# Patient Record
Sex: FEMALE | Race: WHITE | NOT HISPANIC OR LATINO | Employment: UNEMPLOYED | URBAN - METROPOLITAN AREA
[De-identification: names, ages, dates, MRNs, and addresses within clinical notes are randomized per-mention and may not be internally consistent; named-entity substitution may affect disease eponyms.]

---

## 2017-09-14 ENCOUNTER — APPOINTMENT (OUTPATIENT)
Dept: RADIOLOGY | Facility: MEDICAL CENTER | Age: 32
DRG: 689 | End: 2017-09-14
Attending: STUDENT IN AN ORGANIZED HEALTH CARE EDUCATION/TRAINING PROGRAM
Payer: MEDICAID

## 2017-09-14 ENCOUNTER — HOSPITAL ENCOUNTER (INPATIENT)
Facility: MEDICAL CENTER | Age: 32
LOS: 5 days | DRG: 689 | End: 2017-09-19
Attending: EMERGENCY MEDICINE | Admitting: INTERNAL MEDICINE
Payer: MEDICAID

## 2017-09-14 ENCOUNTER — RESOLUTE PROFESSIONAL BILLING HOSPITAL PROF FEE (OUTPATIENT)
Dept: HOSPITALIST | Facility: MEDICAL CENTER | Age: 32
End: 2017-09-14
Payer: MEDICAID

## 2017-09-14 ENCOUNTER — APPOINTMENT (OUTPATIENT)
Dept: RADIOLOGY | Facility: MEDICAL CENTER | Age: 32
DRG: 689 | End: 2017-09-14
Attending: EMERGENCY MEDICINE
Payer: MEDICAID

## 2017-09-14 DIAGNOSIS — R10.84 GENERALIZED ABDOMINAL PAIN: ICD-10-CM

## 2017-09-14 DIAGNOSIS — R11.2 INTRACTABLE VOMITING WITH NAUSEA, UNSPECIFIED VOMITING TYPE: ICD-10-CM

## 2017-09-14 DIAGNOSIS — E86.0 DEHYDRATION: ICD-10-CM

## 2017-09-14 DIAGNOSIS — F12.90 MARIJUANA SMOKER: ICD-10-CM

## 2017-09-14 PROBLEM — E87.6 HYPOKALEMIA: Status: ACTIVE | Noted: 2017-09-14

## 2017-09-14 LAB
ALBUMIN SERPL BCP-MCNC: 4.2 G/DL (ref 3.2–4.9)
ALBUMIN/GLOB SERPL: 1.2 G/DL
ALP SERPL-CCNC: 63 U/L (ref 30–99)
ALT SERPL-CCNC: 10 U/L (ref 2–50)
AMPHET UR QL SCN: NEGATIVE
ANION GAP SERPL CALC-SCNC: 13 MMOL/L (ref 0–11.9)
APPEARANCE UR: CLEAR
AST SERPL-CCNC: 11 U/L (ref 12–45)
BACTERIA #/AREA URNS HPF: NEGATIVE /HPF
BARBITURATES UR QL SCN: NEGATIVE
BASOPHILS # BLD AUTO: 0.4 % (ref 0–1.8)
BASOPHILS # BLD: 0.04 K/UL (ref 0–0.12)
BENZODIAZ UR QL SCN: NEGATIVE
BILIRUB SERPL-MCNC: 0.5 MG/DL (ref 0.1–1.5)
BILIRUB UR QL STRIP.AUTO: NEGATIVE
BUN SERPL-MCNC: 6 MG/DL (ref 8–22)
BZE UR QL SCN: NEGATIVE
CALCIUM SERPL-MCNC: 9.1 MG/DL (ref 8.5–10.5)
CANNABINOIDS UR QL SCN: POSITIVE
CHLORIDE SERPL-SCNC: 104 MMOL/L (ref 96–112)
CO2 SERPL-SCNC: 20 MMOL/L (ref 20–33)
COLOR UR: ABNORMAL
CREAT SERPL-MCNC: 0.63 MG/DL (ref 0.5–1.4)
CULTURE IF INDICATED INDCX: NO UA CULTURE
EOSINOPHIL # BLD AUTO: 0.05 K/UL (ref 0–0.51)
EOSINOPHIL NFR BLD: 0.5 % (ref 0–6.9)
EPI CELLS #/AREA URNS HPF: ABNORMAL /HPF
ERYTHROCYTE [DISTWIDTH] IN BLOOD BY AUTOMATED COUNT: 37.7 FL (ref 35.9–50)
GFR SERPL CREATININE-BSD FRML MDRD: >60 ML/MIN/1.73 M 2
GLOBULIN SER CALC-MCNC: 3.4 G/DL (ref 1.9–3.5)
GLUCOSE SERPL-MCNC: 133 MG/DL (ref 65–99)
GLUCOSE UR STRIP.AUTO-MCNC: ABNORMAL MG/DL
HCG SERPL QL: NEGATIVE
HCT VFR BLD AUTO: 41.1 % (ref 37–47)
HGB BLD-MCNC: 14.1 G/DL (ref 12–16)
HYALINE CASTS #/AREA URNS LPF: ABNORMAL /LPF
IMM GRANULOCYTES # BLD AUTO: 0.06 K/UL (ref 0–0.11)
IMM GRANULOCYTES NFR BLD AUTO: 0.6 % (ref 0–0.9)
KETONES UR STRIP.AUTO-MCNC: 100 MG/DL
LACTATE BLD-SCNC: 1.6 MMOL/L (ref 0.5–2)
LEUKOCYTE ESTERASE UR QL STRIP.AUTO: NEGATIVE
LIPASE SERPL-CCNC: 7 U/L (ref 11–82)
LYMPHOCYTES # BLD AUTO: 1.42 K/UL (ref 1–4.8)
LYMPHOCYTES NFR BLD: 13.4 % (ref 22–41)
MAGNESIUM SERPL-MCNC: 1.8 MG/DL (ref 1.5–2.5)
MCH RBC QN AUTO: 28.5 PG (ref 27–33)
MCHC RBC AUTO-ENTMCNC: 34.3 G/DL (ref 33.6–35)
MCV RBC AUTO: 83.2 FL (ref 81.4–97.8)
METHADONE UR QL SCN: NEGATIVE
MICRO URNS: ABNORMAL
MONOCYTES # BLD AUTO: 0.42 K/UL (ref 0–0.85)
MONOCYTES NFR BLD AUTO: 4 % (ref 0–13.4)
NEUTROPHILS # BLD AUTO: 8.63 K/UL (ref 2–7.15)
NEUTROPHILS NFR BLD: 81.1 % (ref 44–72)
NITRITE UR QL STRIP.AUTO: NEGATIVE
NRBC # BLD AUTO: 0 K/UL
NRBC BLD AUTO-RTO: 0 /100 WBC
OPIATES UR QL SCN: NEGATIVE
OXYCODONE UR QL SCN: NEGATIVE
PCP UR QL SCN: NEGATIVE
PH UR STRIP.AUTO: 8 [PH]
PLATELET # BLD AUTO: 333 K/UL (ref 164–446)
PMV BLD AUTO: 9.6 FL (ref 9–12.9)
POTASSIUM SERPL-SCNC: 3.2 MMOL/L (ref 3.6–5.5)
PROCALCITONIN SERPL-MCNC: <0.05 NG/ML
PROPOXYPH UR QL SCN: NEGATIVE
PROT SERPL-MCNC: 7.6 G/DL (ref 6–8.2)
PROT UR QL STRIP: NEGATIVE MG/DL
RBC # BLD AUTO: 4.94 M/UL (ref 4.2–5.4)
RBC # URNS HPF: ABNORMAL /HPF
RBC UR QL AUTO: ABNORMAL
SODIUM SERPL-SCNC: 137 MMOL/L (ref 135–145)
SP GR UR STRIP.AUTO: 1.01
UROBILINOGEN UR STRIP.AUTO-MCNC: 8 MG/DL
WBC # BLD AUTO: 10.6 K/UL (ref 4.8–10.8)
WBC #/AREA URNS HPF: ABNORMAL /HPF

## 2017-09-14 PROCEDURE — 99285 EMERGENCY DEPT VISIT HI MDM: CPT

## 2017-09-14 PROCEDURE — 700105 HCHG RX REV CODE 258: Performed by: EMERGENCY MEDICINE

## 2017-09-14 PROCEDURE — 80307 DRUG TEST PRSMV CHEM ANLYZR: CPT

## 2017-09-14 PROCEDURE — 700111 HCHG RX REV CODE 636 W/ 250 OVERRIDE (IP): Performed by: STUDENT IN AN ORGANIZED HEALTH CARE EDUCATION/TRAINING PROGRAM

## 2017-09-14 PROCEDURE — 93005 ELECTROCARDIOGRAM TRACING: CPT

## 2017-09-14 PROCEDURE — 96375 TX/PRO/DX INJ NEW DRUG ADDON: CPT

## 2017-09-14 PROCEDURE — 85025 COMPLETE CBC W/AUTO DIFF WBC: CPT

## 2017-09-14 PROCEDURE — 83605 ASSAY OF LACTIC ACID: CPT

## 2017-09-14 PROCEDURE — 87086 URINE CULTURE/COLONY COUNT: CPT

## 2017-09-14 PROCEDURE — 96361 HYDRATE IV INFUSION ADD-ON: CPT

## 2017-09-14 PROCEDURE — 700102 HCHG RX REV CODE 250 W/ 637 OVERRIDE(OP): Performed by: STUDENT IN AN ORGANIZED HEALTH CARE EDUCATION/TRAINING PROGRAM

## 2017-09-14 PROCEDURE — 84145 PROCALCITONIN (PCT): CPT

## 2017-09-14 PROCEDURE — 700117 HCHG RX CONTRAST REV CODE 255: Performed by: EMERGENCY MEDICINE

## 2017-09-14 PROCEDURE — 770006 HCHG ROOM/CARE - MED/SURG/GYN SEMI*

## 2017-09-14 PROCEDURE — 93010 ELECTROCARDIOGRAM REPORT: CPT | Performed by: INTERNAL MEDICINE

## 2017-09-14 PROCEDURE — 93005 ELECTROCARDIOGRAM TRACING: CPT | Performed by: STUDENT IN AN ORGANIZED HEALTH CARE EDUCATION/TRAINING PROGRAM

## 2017-09-14 PROCEDURE — 84703 CHORIONIC GONADOTROPIN ASSAY: CPT

## 2017-09-14 PROCEDURE — 96365 THER/PROPH/DIAG IV INF INIT: CPT

## 2017-09-14 PROCEDURE — 700111 HCHG RX REV CODE 636 W/ 250 OVERRIDE (IP): Performed by: EMERGENCY MEDICINE

## 2017-09-14 PROCEDURE — 96376 TX/PRO/DX INJ SAME DRUG ADON: CPT

## 2017-09-14 PROCEDURE — 80053 COMPREHEN METABOLIC PANEL: CPT

## 2017-09-14 PROCEDURE — 83690 ASSAY OF LIPASE: CPT

## 2017-09-14 PROCEDURE — 36415 COLL VENOUS BLD VENIPUNCTURE: CPT

## 2017-09-14 PROCEDURE — 700111 HCHG RX REV CODE 636 W/ 250 OVERRIDE (IP): Performed by: HOSPITALIST

## 2017-09-14 PROCEDURE — 83735 ASSAY OF MAGNESIUM: CPT

## 2017-09-14 PROCEDURE — 74177 CT ABD & PELVIS W/CONTRAST: CPT

## 2017-09-14 PROCEDURE — 81001 URINALYSIS AUTO W/SCOPE: CPT

## 2017-09-14 PROCEDURE — 700105 HCHG RX REV CODE 258: Performed by: STUDENT IN AN ORGANIZED HEALTH CARE EDUCATION/TRAINING PROGRAM

## 2017-09-14 PROCEDURE — A9270 NON-COVERED ITEM OR SERVICE: HCPCS | Performed by: STUDENT IN AN ORGANIZED HEALTH CARE EDUCATION/TRAINING PROGRAM

## 2017-09-14 RX ORDER — SULFAMETHOXAZOLE AND TRIMETHOPRIM 800; 160 MG/1; MG/1
1 TABLET ORAL 2 TIMES DAILY
Status: ON HOLD | COMMUNITY
Start: 2017-09-10 | End: 2017-09-19

## 2017-09-14 RX ORDER — SODIUM CHLORIDE 9 MG/ML
INJECTION, SOLUTION INTRAVENOUS CONTINUOUS
Status: DISCONTINUED | OUTPATIENT
Start: 2017-09-14 | End: 2017-09-17

## 2017-09-14 RX ORDER — ONDANSETRON 4 MG/1
4 TABLET, ORALLY DISINTEGRATING ORAL EVERY 8 HOURS PRN
COMMUNITY
End: 2017-12-14

## 2017-09-14 RX ORDER — ONDANSETRON 2 MG/ML
4 INJECTION INTRAMUSCULAR; INTRAVENOUS ONCE
Status: COMPLETED | OUTPATIENT
Start: 2017-09-14 | End: 2017-09-14

## 2017-09-14 RX ORDER — ACETAMINOPHEN 325 MG/1
650 TABLET ORAL EVERY 6 HOURS PRN
Status: DISCONTINUED | OUTPATIENT
Start: 2017-09-14 | End: 2017-09-19 | Stop reason: HOSPADM

## 2017-09-14 RX ORDER — METOCLOPRAMIDE HYDROCHLORIDE 5 MG/ML
10 INJECTION INTRAMUSCULAR; INTRAVENOUS EVERY 6 HOURS
Status: DISCONTINUED | OUTPATIENT
Start: 2017-09-14 | End: 2017-09-16

## 2017-09-14 RX ORDER — POTASSIUM CHLORIDE 7.45 MG/ML
10 INJECTION INTRAVENOUS
Status: COMPLETED | OUTPATIENT
Start: 2017-09-14 | End: 2017-09-14

## 2017-09-14 RX ORDER — MORPHINE SULFATE 4 MG/ML
4 INJECTION, SOLUTION INTRAMUSCULAR; INTRAVENOUS ONCE
Status: COMPLETED | OUTPATIENT
Start: 2017-09-14 | End: 2017-09-14

## 2017-09-14 RX ORDER — KETOROLAC TROMETHAMINE 10 MG/1
10 TABLET, FILM COATED ORAL EVERY 8 HOURS PRN
COMMUNITY
End: 2017-12-14

## 2017-09-14 RX ORDER — HEPARIN SODIUM 5000 [USP'U]/ML
5000 INJECTION, SOLUTION INTRAVENOUS; SUBCUTANEOUS EVERY 12 HOURS
Status: DISCONTINUED | OUTPATIENT
Start: 2017-09-14 | End: 2017-09-14

## 2017-09-14 RX ORDER — NICOTINE 21 MG/24HR
14 PATCH, TRANSDERMAL 24 HOURS TRANSDERMAL
Status: DISCONTINUED | OUTPATIENT
Start: 2017-09-15 | End: 2017-09-19 | Stop reason: HOSPADM

## 2017-09-14 RX ORDER — ALBUTEROL SULFATE 90 UG/1
2 AEROSOL, METERED RESPIRATORY (INHALATION) EVERY 6 HOURS PRN
COMMUNITY
End: 2017-12-14

## 2017-09-14 RX ORDER — SODIUM CHLORIDE 9 MG/ML
INJECTION, SOLUTION INTRAVENOUS CONTINUOUS
Status: DISCONTINUED | OUTPATIENT
Start: 2017-09-14 | End: 2017-09-14

## 2017-09-14 RX ORDER — DIPHENHYDRAMINE HYDROCHLORIDE 50 MG/ML
25 INJECTION INTRAMUSCULAR; INTRAVENOUS ONCE
Status: COMPLETED | OUTPATIENT
Start: 2017-09-14 | End: 2017-09-14

## 2017-09-14 RX ADMIN — POTASSIUM CHLORIDE 10 MEQ: 7.46 INJECTION, SOLUTION INTRAVENOUS at 21:24

## 2017-09-14 RX ADMIN — METOCLOPRAMIDE 10 MG: 5 INJECTION, SOLUTION INTRAMUSCULAR; INTRAVENOUS at 19:35

## 2017-09-14 RX ADMIN — POTASSIUM CHLORIDE 10 MEQ: 7.46 INJECTION, SOLUTION INTRAVENOUS at 20:24

## 2017-09-14 RX ADMIN — ONDANSETRON 4 MG: 2 INJECTION INTRAMUSCULAR; INTRAVENOUS at 15:47

## 2017-09-14 RX ADMIN — IOHEXOL 100 ML: 350 INJECTION, SOLUTION INTRAVENOUS at 16:31

## 2017-09-14 RX ADMIN — DIPHENHYDRAMINE HYDROCHLORIDE 25 MG: 50 INJECTION, SOLUTION INTRAMUSCULAR; INTRAVENOUS at 15:47

## 2017-09-14 RX ADMIN — CEFTRIAXONE 2 G: 2 INJECTION, POWDER, FOR SOLUTION INTRAMUSCULAR; INTRAVENOUS at 22:28

## 2017-09-14 RX ADMIN — SODIUM CHLORIDE: 9 INJECTION, SOLUTION INTRAVENOUS at 12:38

## 2017-09-14 RX ADMIN — MORPHINE SULFATE 4 MG: 4 INJECTION INTRAVENOUS at 12:37

## 2017-09-14 RX ADMIN — ONDANSETRON 4 MG: 2 INJECTION INTRAMUSCULAR; INTRAVENOUS at 12:38

## 2017-09-14 RX ADMIN — FAMOTIDINE 20 MG: 10 INJECTION, SOLUTION INTRAVENOUS at 19:35

## 2017-09-14 RX ADMIN — SERTRALINE 50 MG: 50 TABLET, FILM COATED ORAL at 22:28

## 2017-09-14 RX ADMIN — SODIUM CHLORIDE: 9 INJECTION, SOLUTION INTRAVENOUS at 22:28

## 2017-09-14 RX ADMIN — POTASSIUM CHLORIDE 10 MEQ: 7.46 INJECTION, SOLUTION INTRAVENOUS at 18:48

## 2017-09-14 ASSESSMENT — PATIENT HEALTH QUESTIONNAIRE - PHQ9
8. MOVING OR SPEAKING SO SLOWLY THAT OTHER PEOPLE COULD HAVE NOTICED. OR THE OPPOSITE, BEING SO FIGETY OR RESTLESS THAT YOU HAVE BEEN MOVING AROUND A LOT MORE THAN USUAL: SEVERAL DAYS
SUM OF ALL RESPONSES TO PHQ QUESTIONS 1-9: 12
5. POOR APPETITE OR OVEREATING: SEVERAL DAYS
SUM OF ALL RESPONSES TO PHQ9 QUESTIONS 1 AND 2: 5
2. FEELING DOWN, DEPRESSED, IRRITABLE, OR HOPELESS: NEARLY EVERY DAY
1. LITTLE INTEREST OR PLEASURE IN DOING THINGS: MORE THAN HALF THE DAYS
6. FEELING BAD ABOUT YOURSELF - OR THAT YOU ARE A FAILURE OR HAVE LET YOURSELF OR YOUR FAMILY DOWN: NEARLY EVERY DAY
4. FEELING TIRED OR HAVING LITTLE ENERGY: MORE THAN HALF THE DAYS
9. THOUGHTS THAT YOU WOULD BE BETTER OFF DEAD, OR OF HURTING YOURSELF: NOT AT ALL
7. TROUBLE CONCENTRATING ON THINGS, SUCH AS READING THE NEWSPAPER OR WATCHING TELEVISION: NOT AT ALL
3. TROUBLE FALLING OR STAYING ASLEEP OR SLEEPING TOO MUCH: NOT AT ALL

## 2017-09-14 ASSESSMENT — LIFESTYLE VARIABLES
DO YOU DRINK ALCOHOL: NO
ALCOHOL_USE: NO
EVER_SMOKED: YES

## 2017-09-14 ASSESSMENT — ENCOUNTER SYMPTOMS
SHORTNESS OF BREATH: 0
ABDOMINAL PAIN: 1
DIARRHEA: 0
VOMITING: 1
PND: 0
NAUSEA: 1
CONSTIPATION: 0
MYALGIAS: 0
FEVER: 1
WHEEZING: 0
PALPITATIONS: 0
CHILLS: 0
BLOOD IN STOOL: 0
FLANK PAIN: 0

## 2017-09-14 ASSESSMENT — PAIN SCALES - GENERAL
PAINLEVEL_OUTOF10: 3
PAINLEVEL_OUTOF10: 8

## 2017-09-14 NOTE — ED NOTES
Pt to triage c/o N/V. Pt seen by [facility] twice and dx with UTI. Pt unable to tolerate water or solids. Pt restless and crying in triage.   Pt advised to return to triage nurse for any changes or concerns.

## 2017-09-14 NOTE — ED PROVIDER NOTES
ED Provider Note    Scribed for Delfino Diego M.D. by Shira Storey. 9/14/2017  12:26 PM    Primary Care Provider: Pregnancy JOSE GUADALUPE Huffman  Means of arrival: Walk-in  History limited by: None    CHIEF COMPLAINT  Chief Complaint   Patient presents with   • N/V   • UTI       HPI  Rikki Toscano is a 32 y.o. female who presents to the ED complaining of nausea and vomiting onset 6 days ago. The patient reports initially developing abdominal cramps on Friday night 7 days ago, but woke immediately the next day with the development of nausea and reports a total of 15 episodes of vomiting throughout the day consisting most of yellow bile. She felt improved on Sunday, but began experiencing multiple episodes of vomiting again the next day on Monday 4 days ago. She was seen at [facility] twice for symptoms and had labs and CT-abdomen performed, but patient states she was not informed of the results due to being lethargic secondary medications. She was started on antibiotic Ciprofloxan for diagnosed UTI, and received Phenergan suppository, which she lasted used this morning with no relief. The patient reports associated numbness to bilateral upper extremities, abdominal pain, sweats, and bilateral back pain, but denies any diarrhea, headache, blurred vision, rhinorrhea, chest pain, shortness of breath, skin rashes, weakness, or depression. She states she has never experienced similar symptoms previously. The patient has no past history of diabetes, hypertension, or cancer. She reports past abdominal surgery of 2 c-sections and her last menstrual period was 9/1/2017. She admits to smoking 6 cigarettes daily, but does not drink alcohol or use recreational drugs.     REVIEW OF SYSTEMS    CONSTITUTIONAL: Sweats, Denies weakness.  EYES:  Denies blurred vision.  ENT:  Denies rhinorrhea.  CARDIOVASCULAR:  Denies chest pain.  RESPIRATORY:  Denies shortness of breath, difficulty breathing.  GI: Nausea, vomiting, and abdominal pain, denies  "diarrhea.   MUSCULOSKELETAL: Bilateral back pain.  SKIN:  No rash.  NEUROLOGIC: Numbness to bilateral upper extremities, denies headache or weakness.   PSYCHIATRIC:  Denies depression.  C.     PAST MEDICAL HISTORY  Past Medical History:   Diagnosis Date   • Asthma      FAMILY HISTORY  No family history noted     SOCIAL HISTORY   reports that she has been smoking Cigarettes.  She has a 1.00 pack-year smoking history. She reports that she does not drink alcohol or use drugs.    SURGICAL HISTORY  Past Surgical History:   Procedure Laterality Date   • GYN SURGERY  2010           CURRENT MEDICATIONS  No current facility-administered medications for this encounter.     Current Outpatient Prescriptions:   •  oxycodone-acetaminophen (PERCOCET) 5-325 MG TABS, Take 1-2 Tabs by mouth every four hours as needed for Mild Pain (pain). No driving, no drinking alcohol, Disp: 20 Each, Rfl: 0  •  ALBUTEROL INH, , Disp: , Rfl:      ALLERGIES  No Known Allergies    PHYSICAL EXAM  VITAL SIGNS: /65   Pulse 62   Temp 36.4 °C (97.5 °F)   Resp 18   Ht 1.626 m (5' 4\")   Wt 79.3 kg (174 lb 13.2 oz)   SpO2 99%   BMI 30.01 kg/m²      Constitutional: Patient is awake and alert. No acute respiratory distress. Well developed, Well nourished, Non-toxic appearance.   HENT: Normocephalic, Atraumatic, Bilateral external ears normal, Oropharynx pink moist with no exudates, Nose patent.  Eyes: PERRLA, EOMI, Sclera and conjunctiva clear, No discharge.   Neck:  Supple no nuchal rigidity, no thyromegaly or mass. Non-tender  Lymphatic: No supraclavicular lymph nodes.   Cardiovascular: Heart is distant, regular rate and rhythm no murmur, rub or thrill.   Thorax & Lungs: Chest is symmetrical, with good breath sounds. No wheezing or crackles. No respiratory distress, No chest tenderness.   Abdomen: Soft, right-sided upper quadrant and epigastric tenderness primarily to epigastrium, no hepatosplenomegaly there is no guarding or rebound, " No masses, No pulsatile masses. Bowel sounds are present.  Skin: Warm, Dry, no petechia, purpura, or rash.   Back: Non tender with palpation, No CVA tenderness.   Extremities: No edema. Non tender.   Musculoskeletal: Good range of motion to wrists, elbows, shoulders, hips, knees, and ankles. Pulses 2+ radially and femorally. No gross deformities noted.   Neurologic: Alert & oriented to person, time, and place.  Strength is 5 over 5 and symmetric in bilateral upper and lower extremities.   Psychiatric: Anxious appearing.     EKG  1256- 13 Lead EKG interpreted by me shows sinus bradycardia rhythm at rate 43.  . Axis QRS 59, No ST elevations. No old EKG for comparison.    LABS  Results for orders placed or performed during the hospital encounter of 09/14/17   CBC WITH DIFFERENTIAL   Result Value Ref Range    WBC 10.6 4.8 - 10.8 K/uL    RBC 4.94 4.20 - 5.40 M/uL    Hemoglobin 14.1 12.0 - 16.0 g/dL    Hematocrit 41.1 37.0 - 47.0 %    MCV 83.2 81.4 - 97.8 fL    MCH 28.5 27.0 - 33.0 pg    MCHC 34.3 33.6 - 35.0 g/dL    RDW 37.7 35.9 - 50.0 fL    Platelet Count 333 164 - 446 K/uL    MPV 9.6 9.0 - 12.9 fL    Neutrophils-Polys 81.10 (H) 44.00 - 72.00 %    Lymphocytes 13.40 (L) 22.00 - 41.00 %    Monocytes 4.00 0.00 - 13.40 %    Eosinophils 0.50 0.00 - 6.90 %    Basophils 0.40 0.00 - 1.80 %    Immature Granulocytes 0.60 0.00 - 0.90 %    Nucleated RBC 0.00 /100 WBC    Neutrophils (Absolute) 8.63 (H) 2.00 - 7.15 K/uL    Lymphs (Absolute) 1.42 1.00 - 4.80 K/uL    Monos (Absolute) 0.42 0.00 - 0.85 K/uL    Eos (Absolute) 0.05 0.00 - 0.51 K/uL    Baso (Absolute) 0.04 0.00 - 0.12 K/uL    Immature Granulocytes (abs) 0.06 0.00 - 0.11 K/uL    NRBC (Absolute) 0.00 K/uL   COMP METABOLIC PANEL   Result Value Ref Range    Sodium 137 135 - 145 mmol/L    Potassium 3.2 (L) 3.6 - 5.5 mmol/L    Chloride 104 96 - 112 mmol/L    Co2 20 20 - 33 mmol/L    Anion Gap 13.0 (H) 0.0 - 11.9    Glucose 133 (H) 65 - 99 mg/dL    Bun 6 (L) 8 - 22 mg/dL     Creatinine 0.63 0.50 - 1.40 mg/dL    Calcium 9.1 8.5 - 10.5 mg/dL    AST(SGOT) 11 (L) 12 - 45 U/L    ALT(SGPT) 10 2 - 50 U/L    Alkaline Phosphatase 63 30 - 99 U/L    Total Bilirubin 0.5 0.1 - 1.5 mg/dL    Albumin 4.2 3.2 - 4.9 g/dL    Total Protein 7.6 6.0 - 8.2 g/dL    Globulin 3.4 1.9 - 3.5 g/dL    A-G Ratio 1.2 g/dL   LIPASE   Result Value Ref Range    Lipase 7 (L) 11 - 82 U/L   LACTIC ACID   Result Value Ref Range    Lactic Acid 1.6 0.5 - 2.0 mmol/L   URINALYSIS CULTURE, IF INDICATED   Result Value Ref Range    Micro Urine Req Microscopic     Color Straw     Character Clear     Specific Gravity 1.010 <1.035    Ph 8.0 5.0 - 8.0    Glucose Trace (A) Negative mg/dL    Ketones 100 (A) Negative mg/dL    Protein Negative Negative mg/dL    Bilirubin Negative Negative    Urobilinogen, Urine 8.0 Negative    Nitrite Negative Negative    Leukocyte Esterase Negative Negative    Occult Blood Trace (A) Negative    Culture Indicated No UA Culture   HCG QUAL SERUM   Result Value Ref Range    Beta-Hcg Qualitative Serum Negative Negative   URINE DRUG SCREEN   Result Value Ref Range    Amphetamines Urine Negative Negative    Barbiturates Negative Negative    Benzodiazepines Negative Negative    Cocaine Metabolite Negative Negative    Methadone Negative Negative    Opiates Negative Negative    Oxycodone Negative Negative    Phencyclidine -Pcp Negative Negative    Propoxyphene Negative Negative    Cannabinoid Metab Positive (A) Negative   URINE MICROSCOPIC (W/UA)   Result Value Ref Range    WBC 0-2 /hpf    RBC 2-5 (A) /hpf    Bacteria Negative None /hpf    Epithelial Cells Few /hpf    Hyaline Cast 0-2 /lpf   ESTIMATED GFR   Result Value Ref Range    GFR If African American >60 >60 mL/min/1.73 m 2    GFR If Non African American >60 >60 mL/min/1.73 m 2   All labs reviewed by me.    RADIOLOGY/PROCEDURES  CT-ABDOMEN-PELVIS WITH   Final Result      2.3 x 2.5 x 3.9 cm left adnexal cystic lesion for which further evaluation with pelvic  ultrasound is recommended.      Small amount of nonspecific free fluid in the pelvis.      Ill-defined 1.7 cm hypodense lesion in the upper to midpole of the right kidney could represent focal pyelonephritis. Solid mass on excluded. Further evaluation with renal protocol MRI is recommended.      Evaluation of the colon limited by relative decompression. Mild wall thickening difficult to exclude.         The radiologist's interpretations of all radiological studies have been reviewed by me.     COURSE & MEDICAL DECISION MAKING  Pertinent Labs & Imaging studies reviewed. (See chart for details)    Differential diagnoses include but are not limited to pyeloneprhtisi, renal colic, gall bladder disease, intussicicneception vulvulus, pregnancy.     12:26 PM - Patient seen and examined at bedside. Ordered CBC, CMP, lipase, lactic acid, estimated GFR, urinalysis culture, HCG qual serum, and urine drug screen.  Patient will be medicated with normal saline infusion for vomiting, morphine 4mg, and Zofran 4mg for her symptoms.     12:46 PM I got called into the room by nursing staff because the patient's HR is now 47. Patient reports feeling well at this time. Discussed plan to order EKG and possible admission to the hospitalist, given this is her 3rd hospital visit for vomiting.     1:50 PM Reviewed the patient's lab results, as shown above. Ordered CT-abdomen contrast for further evaluation.     3:44 PM Nursing staff informed me the patient is vomiting again. She will be treated with Benadryl 25mg and Zofran 4mg.     4:43 PM Reviewed the patient's CT result as shown above.     4:53 PM Patient was reevaluated at bedside. She is still vomiting. Discussed lab and radiology results with the patient and informed them of the results shown above. Patient admits to using marijuana around the time she began vomiting. Recommended she be admitted to the Hospitalist for IV hydration and further medical treatment and care, which she  understands and agrees with.    4:58 PM I discussed the patient's case and the above findings with [facility] who agreed to admit the patient.     Decision Making  Patient presents to emergency department with intractable nausea vomiting abdominal pain. She didn't seen twice at [facility]. We tried to obtain records however were unable to do so while she is in the emergency room. Emergency room were given her several medications for nausea and vomiting did seem to slow her vomiting down but never got rid of it. Also gave her some pain medicines. CAT scan was obtained since she told us that [place] [facility]. This did not show any obvious causes although some abnormal findings including mass or left pelvis area but both her mother stated that she had some cystic lesions in the past. This period time she's continued nausea and vomiting and I felt that she would need to be admitted to the hospital for intractable nausea and vomiting. She does admit to marijuana use. Unclear if this is marijuana-induced intractable nausea and vomiting. I did discuss this with the patient.    DISPOSITION:  Patient will be admitted to [facility] in guarded condition.     FINAL IMPRESSION  1. Intractable vomiting with nausea, unspecified vomiting type    2. Dehydration    3. Generalized abdominal pain    4. Marijuana smoker      PLAN  1.Admission [facility]  2. Continue IV fluids and antiemetics and workup of her generalized abdominal pain and vomiting      Shira VILLAFUERTE (Juan Carlos), am scribing for, and in the presence of, Delfino Diego M.D..    Electronically signed by: Shira Storey (Juan Carlos), 9/14/2017    Delfino VILLAFUERTE M.D. personally performed the services described in this documentation, as scribed by Shira Storey in my presence, and it is both accurate and complete.    The note accurately reflects work and decisions made by me.  Delfino Diego  9/14/2017   7:05 PM

## 2017-09-15 ENCOUNTER — APPOINTMENT (OUTPATIENT)
Dept: RADIOLOGY | Facility: MEDICAL CENTER | Age: 32
DRG: 689 | End: 2017-09-15
Attending: STUDENT IN AN ORGANIZED HEALTH CARE EDUCATION/TRAINING PROGRAM
Payer: MEDICAID

## 2017-09-15 LAB
ALBUMIN SERPL BCP-MCNC: 3.6 G/DL (ref 3.2–4.9)
ALBUMIN/GLOB SERPL: 1.4 G/DL
ALP SERPL-CCNC: 53 U/L (ref 30–99)
ALT SERPL-CCNC: 5 U/L (ref 2–50)
ANION GAP SERPL CALC-SCNC: 11 MMOL/L (ref 0–11.9)
AST SERPL-CCNC: 9 U/L (ref 12–45)
BASOPHILS # BLD AUTO: 0.3 % (ref 0–1.8)
BASOPHILS # BLD: 0.04 K/UL (ref 0–0.12)
BILIRUB SERPL-MCNC: 0.4 MG/DL (ref 0.1–1.5)
BUN SERPL-MCNC: 5 MG/DL (ref 8–22)
C TRACH DNA SPEC QL NAA+PROBE: NEGATIVE
C TRACH DNA SPEC QL NAA+PROBE: NEGATIVE
CALCIUM SERPL-MCNC: 8.4 MG/DL (ref 8.5–10.5)
CHLORIDE SERPL-SCNC: 107 MMOL/L (ref 96–112)
CO2 SERPL-SCNC: 19 MMOL/L (ref 20–33)
CREAT SERPL-MCNC: 0.54 MG/DL (ref 0.5–1.4)
EKG IMPRESSION: NORMAL
EOSINOPHIL # BLD AUTO: 0.11 K/UL (ref 0–0.51)
EOSINOPHIL NFR BLD: 0.9 % (ref 0–6.9)
ERYTHROCYTE [DISTWIDTH] IN BLOOD BY AUTOMATED COUNT: 39 FL (ref 35.9–50)
GFR SERPL CREATININE-BSD FRML MDRD: >60 ML/MIN/1.73 M 2
GLOBULIN SER CALC-MCNC: 2.6 G/DL (ref 1.9–3.5)
GLUCOSE SERPL-MCNC: 86 MG/DL (ref 65–99)
HCT VFR BLD AUTO: 36.6 % (ref 37–47)
HGB BLD-MCNC: 12.3 G/DL (ref 12–16)
IMM GRANULOCYTES # BLD AUTO: 0.06 K/UL (ref 0–0.11)
IMM GRANULOCYTES NFR BLD AUTO: 0.5 % (ref 0–0.9)
LYMPHOCYTES # BLD AUTO: 3.52 K/UL (ref 1–4.8)
LYMPHOCYTES NFR BLD: 27.4 % (ref 22–41)
MCH RBC QN AUTO: 28.5 PG (ref 27–33)
MCHC RBC AUTO-ENTMCNC: 33.6 G/DL (ref 33.6–35)
MCV RBC AUTO: 84.7 FL (ref 81.4–97.8)
MONOCYTES # BLD AUTO: 0.66 K/UL (ref 0–0.85)
MONOCYTES NFR BLD AUTO: 5.1 % (ref 0–13.4)
N GONORRHOEA DNA SPEC QL NAA+PROBE: NEGATIVE
N GONORRHOEA DNA SPEC QL NAA+PROBE: NEGATIVE
NEUTROPHILS # BLD AUTO: 8.44 K/UL (ref 2–7.15)
NEUTROPHILS NFR BLD: 65.8 % (ref 44–72)
NRBC # BLD AUTO: 0 K/UL
NRBC BLD AUTO-RTO: 0 /100 WBC
PLATELET # BLD AUTO: 316 K/UL (ref 164–446)
PMV BLD AUTO: 9.6 FL (ref 9–12.9)
POTASSIUM SERPL-SCNC: 3.1 MMOL/L (ref 3.6–5.5)
PROT SERPL-MCNC: 6.2 G/DL (ref 6–8.2)
RBC # BLD AUTO: 4.32 M/UL (ref 4.2–5.4)
SODIUM SERPL-SCNC: 137 MMOL/L (ref 135–145)
SPECIMEN SOURCE: NORMAL
SPECIMEN SOURCE: NORMAL
WBC # BLD AUTO: 12.8 K/UL (ref 4.8–10.8)

## 2017-09-15 PROCEDURE — 700111 HCHG RX REV CODE 636 W/ 250 OVERRIDE (IP): Performed by: STUDENT IN AN ORGANIZED HEALTH CARE EDUCATION/TRAINING PROGRAM

## 2017-09-15 PROCEDURE — 99223 1ST HOSP IP/OBS HIGH 75: CPT | Mod: GC | Performed by: INTERNAL MEDICINE

## 2017-09-15 PROCEDURE — 700105 HCHG RX REV CODE 258: Performed by: STUDENT IN AN ORGANIZED HEALTH CARE EDUCATION/TRAINING PROGRAM

## 2017-09-15 PROCEDURE — 700102 HCHG RX REV CODE 250 W/ 637 OVERRIDE(OP): Performed by: STUDENT IN AN ORGANIZED HEALTH CARE EDUCATION/TRAINING PROGRAM

## 2017-09-15 PROCEDURE — 87491 CHLMYD TRACH DNA AMP PROBE: CPT

## 2017-09-15 PROCEDURE — 76830 TRANSVAGINAL US NON-OB: CPT

## 2017-09-15 PROCEDURE — 87591 N.GONORRHOEAE DNA AMP PROB: CPT

## 2017-09-15 PROCEDURE — 36415 COLL VENOUS BLD VENIPUNCTURE: CPT

## 2017-09-15 PROCEDURE — 87040 BLOOD CULTURE FOR BACTERIA: CPT

## 2017-09-15 PROCEDURE — 770006 HCHG ROOM/CARE - MED/SURG/GYN SEMI*

## 2017-09-15 PROCEDURE — 80053 COMPREHEN METABOLIC PANEL: CPT

## 2017-09-15 PROCEDURE — A9270 NON-COVERED ITEM OR SERVICE: HCPCS | Performed by: STUDENT IN AN ORGANIZED HEALTH CARE EDUCATION/TRAINING PROGRAM

## 2017-09-15 PROCEDURE — 85025 COMPLETE CBC W/AUTO DIFF WBC: CPT

## 2017-09-15 RX ORDER — POTASSIUM CHLORIDE 7.45 MG/ML
10 INJECTION INTRAVENOUS
Status: DISCONTINUED | OUTPATIENT
Start: 2017-09-15 | End: 2017-09-15

## 2017-09-15 RX ORDER — DOXYCYCLINE 100 MG/1
100 TABLET ORAL EVERY 12 HOURS
Status: DISCONTINUED | OUTPATIENT
Start: 2017-09-15 | End: 2017-09-16

## 2017-09-15 RX ORDER — POTASSIUM CHLORIDE 20 MEQ/1
40 TABLET, EXTENDED RELEASE ORAL DAILY
Status: DISCONTINUED | OUTPATIENT
Start: 2017-09-15 | End: 2017-09-16

## 2017-09-15 RX ADMIN — FAMOTIDINE 20 MG: 10 INJECTION, SOLUTION INTRAVENOUS at 09:22

## 2017-09-15 RX ADMIN — METOCLOPRAMIDE 10 MG: 5 INJECTION, SOLUTION INTRAMUSCULAR; INTRAVENOUS at 06:06

## 2017-09-15 RX ADMIN — SERTRALINE 50 MG: 50 TABLET, FILM COATED ORAL at 20:25

## 2017-09-15 RX ADMIN — CEFTRIAXONE 2 G: 2 INJECTION, POWDER, FOR SOLUTION INTRAMUSCULAR; INTRAVENOUS at 20:25

## 2017-09-15 RX ADMIN — METOCLOPRAMIDE 10 MG: 5 INJECTION, SOLUTION INTRAMUSCULAR; INTRAVENOUS at 00:31

## 2017-09-15 RX ADMIN — POTASSIUM CHLORIDE 40 MEQ: 1500 TABLET, EXTENDED RELEASE ORAL at 09:21

## 2017-09-15 RX ADMIN — ACETAMINOPHEN 650 MG: 325 TABLET, FILM COATED ORAL at 23:57

## 2017-09-15 RX ADMIN — METOCLOPRAMIDE 10 MG: 5 INJECTION, SOLUTION INTRAMUSCULAR; INTRAVENOUS at 23:57

## 2017-09-15 RX ADMIN — METOCLOPRAMIDE 10 MG: 5 INJECTION, SOLUTION INTRAMUSCULAR; INTRAVENOUS at 13:01

## 2017-09-15 RX ADMIN — ACETAMINOPHEN 650 MG: 325 TABLET, FILM COATED ORAL at 17:28

## 2017-09-15 RX ADMIN — DOXYCYCLINE 100 MG: 100 TABLET ORAL at 13:01

## 2017-09-15 RX ADMIN — SODIUM CHLORIDE 1000 ML: 9 INJECTION, SOLUTION INTRAVENOUS at 13:54

## 2017-09-15 RX ADMIN — METOCLOPRAMIDE 10 MG: 5 INJECTION, SOLUTION INTRAMUSCULAR; INTRAVENOUS at 18:09

## 2017-09-15 ASSESSMENT — ENCOUNTER SYMPTOMS
FEVER: 0
TINGLING: 0
FOCAL WEAKNESS: 0
BLURRED VISION: 0
DOUBLE VISION: 0
DIARRHEA: 1
SHORTNESS OF BREATH: 0
DIZZINESS: 0
ORTHOPNEA: 0
BLOOD IN STOOL: 0
NERVOUS/ANXIOUS: 0
NAUSEA: 0
ABDOMINAL PAIN: 1
SORE THROAT: 0
VOMITING: 0
TREMORS: 0
PALPITATIONS: 0
DEPRESSION: 0
SPEECH CHANGE: 0
HEADACHES: 0
HEARTBURN: 0
NAUSEA: 1
FLANK PAIN: 0
CHILLS: 0
EYE PAIN: 0
CONSTIPATION: 0
DIARRHEA: 0
MYALGIAS: 0
SPUTUM PRODUCTION: 0
COUGH: 0
WEAKNESS: 0
SENSORY CHANGE: 0
WHEEZING: 0

## 2017-09-15 ASSESSMENT — LIFESTYLE VARIABLES: DO YOU DRINK ALCOHOL: NO

## 2017-09-15 ASSESSMENT — PAIN SCALES - GENERAL
PAINLEVEL_OUTOF10: 3
PAINLEVEL_OUTOF10: 1

## 2017-09-15 NOTE — PROGRESS NOTES
Report received. Pt to unit from ER. Assumed care, assessment complete. Pt is A & O x 4.  Pt reports abdominal cramping. Pt medicated for nausea per MAR. Fall precautions and appropriate signs in place. Pt oriented to unit routine, call light/phone system and RN extension number provided. Pt educated regarding fall precautions. Bed alarm not indicated. Pt denies any additional needs at this time. Call light within reach.

## 2017-09-15 NOTE — ED NOTES
Med Rec completed per patient at bedside.  Allergies reviewed      Bactrim 800/160  10 day course

## 2017-09-15 NOTE — PROGRESS NOTES
"Assumed care of patient at 0700 . Received report from RN. Patient is AOX4 . Assessment complete. Labs reviewed.Patient and RN discussed plan of care. Patient questions answered. Patient needs are met at this time. Bed in lowest and locked position. Upper bed rails up.  Call light is within reach. Hourly rounding in place.  /65   Pulse 75   Temp 36.7 °C (98 °F)   Resp 18   Ht 1.626 m (5' 4\")   Wt 79.3 kg (174 lb 13.2 oz)   LMP 09/02/2017 (Exact Date)   SpO2 96%   Breastfeeding? No   BMI 30.01 kg/m²     "

## 2017-09-15 NOTE — PROGRESS NOTES
Isabelle Howe Fall Risk Assessment:     Last Known Fall: No falls  Mobility: No limitations  Medications: No meds  Mental Status/LOC/Awareness: Awake, alert, and oriented to date, place, and person  Toileting Needs: No needs  Volume/Electrolyte Status: Use of IV fluids/tube feeds  Communication/Sensory: Visual (Glasses)/hearing deficit  Behavior: Depression/anxiety  Isabelle Howe Fall Risk Total: 5  Fall Risk Level: NO RISK    Universal Fall Precautions:  call light/belongings in reach, bed in low position and locked, siderails up x 2, use non-slip footwear, adequate lighting, clutter free and spill free environment, educate to call for assistance, educate on level of risk    Fall Risk Level Interventions:          Patient Specific Interventions:     Medication: review medications with patient and family  Mental Status/LOC/Awareness: not applicable  Toileting: not applicable  Volume/Electrolyte Status: ensure patient remains hydrated, advance diet as tolerated, administer medications as ordered for nausea and vomiting and monitor abnormal lab values  Communication/Sensory: not applicable  Behavioral: encourage patient to voice feelings  Mobility: not applicable

## 2017-09-15 NOTE — CARE PLAN
Problem: Knowledge Deficit  Goal: Knowledge of disease process/condition, treatment plan, diagnostic tests, and medications will improve    Intervention: Explain information regarding disease process/condition, treatment plan, diagnostic tests, and medications and document in education  PT updated on POC, all questions answered       Problem: Psychosocial Needs:  Goal: Level of anxiety will decrease    Intervention: Identify and develop with patient strategies to cope with anxiety triggers  Calming techniques in use to reduce anxiety

## 2017-09-15 NOTE — ASSESSMENT & PLAN NOTE
Presented with 6 day h/o intractable vomiting and nausea with diffuse abdominal pain, worst in LLQ. No h/o dysuria or diarrhea. No fevers. CT abdomen showed 2.3 x 2.5 x 3.9 cm left adnexal cystic with nonspecific free fluid in the pelvis and an ill-defined 1.7 cm hypodense lesion in the upper to midpole of the right kidney, which could represent focal pyelonephritis.UA neg leuk esterase and nitrite, +ketones. Pregnancy test negative. Procalcitonin neg. Bcx and UCx negx2.  Urine GC/CT neg. U/s showed hydrosalpinx.   - Supportive care with IV zofran & Reglan, pain control  - GI PPX with pepcid injections   - IP Gyn Consult placed - recommend electrolyte repletion, empiric treatment with doxycycline.   - Doxycycline started for 14 day course on 9/15. Will be administered IV for now. Ceftriaxone continued to cover for potential partially treated pylenephritis, as this cannot be ruled out.  - Dilaudid 0.2-0.5 mg IV q 3 prn and dilaudid 2 mg PO q 4 PRN added with hold parameters  - C/w IVF  - Restart doing clear liquids diets, advance as tolerated to full liquid then regular  - If pain acutely worsens, consider repeat pelvic u/s

## 2017-09-15 NOTE — PROGRESS NOTES
Spoke with Radiologist who read CT Abd/Pelvis yesterday (Dr. Sarmiento) . Impression included an ill-defined 1.7 cm hypodense lesion in the upper to midpole of the right kidney could represent focal pyelonephritis. Solid mass on excluded. Further evaluation with renal protocol MRI was recommended.    As per Guillermina, further work up with renal protocol is highly suggested as we are unable to definitely rule out a mass in light of abnormal CT findings.     Medical Student John Arceo spoke with patient to inform her of this plan. Pt reported continues LLQ pain but also RUQ and R flank TTP. She continues to be nauseous but feels she would be able to tolerate advancement of diet and is requesting this. She also reported 6 episodes of loose diarrhea since this morning.    A/P:  Renal Protocol MRI is indicated to rule out renal mass and further investigate if pyelonephritis is present. I spoke with Radiology regarding scheduling, and pt is likely to have the procedure performed this evening at best, and possibly tomorrow morning. She will not need to be NPO for the procedure.     OK to advance diet to regular as tolerated. If she is unable to tolerate this, we will switch her back to clears. Diarrhea could be a side effect of antibiotics. CTM.

## 2017-09-15 NOTE — SENIOR ADMIT NOTE
32 years old female with past medical history of asthma comes with a 6 day history of progressive nausea and vomiting, seen at [facility] 5 days ago and was told she had a urinary tract infection and was given bactrim, and ondansetron for which she got transient improvement. She reports that she has not been able to keep anything down for the past one day, she does not have any diarrhea or constipation she does report having diffuse abdominal pain however no dysuria, frequency, and urgency. She reports having chills, however she was not able to measure her temperature. Reports that her last menstrual period was around September 1, she denies having any vaginal discharge or bleeding.     Her exam is remarkable for heart rate from 42-72, blood pressure around 130s over 70s to 80s, saturating % on room air, complaining of pain in fetal position, tender left lower quadrant of her abdomen. No costovertebral angle tenderness. Abdomen is soft, no rebound no rigidity, and has positive bowel sounds. Lungs are clear to auscultation, no added sounds.    Her labs are remarkable for a white blood cell count of the lobe of 10.6, with 91% neutrophilia, her CMP is remarkable for a potassium of 3.2, and an anion gap of 13 and a bicarbonate of 20, lactic acid was 1.6 and lipase of only 7. Urine drug screen was positive for marijuana. Urinalysis shows some white blood cells on several blood cells, nitrate, negative leukocyte esterase is negative.    CT abdomen pelvis with contrast showed 2.3 x 2.5 x 3.9 cm left adnexal cystic lesion. Small amount of nonspecific free fluid in the pelvis. Ill-defined 1.7 cm hypodense lesion in the upper to midpole of the right kidney could represent focal pyelonephritis.    Assessment and plan  Abdominal pain  Nausea vomiting  Hyperkalemia  Possible pyelonephritis  Admission to telemetry floor  Differentials for her abdominal pain include ruptured ovarian cyst, & torsion.    Pelvic ultrasound ordered   OB/GYN consulted, follow recommendations  Intravenous fluids, nothing by mouth  Blood cultures & urine cultures ordered   Pro-calcitonin ordered  Will start ceftriaxone, her urine is not very remarkable for infection however she was on Bactrim for the past 4 days, and partial improvement.  Pain control and anti-emetics.  Replacing potassium and check magnesium  Follow-up BMP

## 2017-09-15 NOTE — PROGRESS NOTES
Spoke with Dr. Edwards, on call Gynecologist.     As per Dr. Edwards, L adnexal cyst could be a physiologic finding. Ovarian torsion less likely as this typically occurs with cysts >5cm. Supportive care with IVF, anti-emetics, and electrolyte repletion is indicated. Must wait to see what pelvic u/s shows and see what it says about blood flow. If blood flow is limited, may consider torsion.    Requests that we call her back once pelvic u/s results at [phone] with the following additional information:  1) last menstrual period - 9/11  2) contraception use  3) name of ob/gyn she regularly sees (if known)    Heparin was d/c'ed for potential bleeding risk.    Additionally, further review shows she received 4 days of bactrim. She may have partially treated pylenophritis. UA may be sterile due to this. It is reasonable to start empiric antibiotics.  - Ceftriaxone ordered.

## 2017-09-15 NOTE — PROGRESS NOTES
Isabelle Howe Fall Risk Assessment:     Last Known Fall: No falls  Mobility: No limitations  Medications: No meds  Mental Status/LOC/Awareness: Awake, alert, and oriented to date, place, and person  Toileting Needs: No needs  Volume/Electrolyte Status: Use of IV fluids/tube feeds  Communication/Sensory: Visual (Glasses)/hearing deficit  Behavior: Depression/anxiety  Isabelle Howe Fall Risk Total: 5  Fall Risk Level: NO RISK    Universal Fall Precautions:  call light/belongings in reach, bed in low position and locked, siderails up x 2, use non-slip footwear    Fall Risk Level Interventions:          Patient Specific Interventions:     Medication: review medications with patient and family and limit combination of prn medications  Mental Status/LOC/Awareness: reinforce falls education, encourage family to stay with patient and check on patient hourly  Toileting: monitor intake and output/use of appropriate interventions and instruct patient/family on the use of grab bars  Volume/Electrolyte Status: monitor abnormal lab values and ensure IV fluids are appropriate  Communication/Sensory: update plan of care on whiteboard and for visually impaired patients orient to their room surrounding and do not change their surroundings  Behavioral: encourage patient to voice feelings and engage patient in daily activities  Mobility: utilize bed/chair fall alarm and ensure bed is locked and in lowest position

## 2017-09-15 NOTE — PROGRESS NOTES
Internal Medicine Interval Note    Name Rikki Toscano     1985   Age/Sex 32 y.o. female   MRN [id number]   Code Status Full     After 5PM or if no immediate response to page, please call for cross-coverage  Attending/Team: Dr. Green/Madhav See Patient List for primary contact information  Call [phone] to page    1st Call - Day Intern (R1):   Dr. Sharp 2nd Call - Day Sr. Resident (R2/R3):   Dr. Rowland         Reason for interval visit  (Principal Problem)   Intractable vomiting with nausea    Interval Problem Daily Status Update  (24 hours)   Pt seen and examined at bedside. Pt feels much better. She reports improvement in abdominal pain, which she now describes as diffuse but mild. She has not had episodes of vomiting since being transferred to her IP room. She has been on strict NPO and feels ready to advance to a clear liquid diet.     Additional information about ob/gyn history obtained. Pt reports LMP was 17. She reports a h/o PID in , treated with doxy x 10 days. She has had 2 children since then without complications other than hyperemesis gravidum. No ectopic pregnancies. No difficulty with fertility. Pt's OB/GYN is Dr. Hernando Hay.    On call gynecologist Dr. Edwards called. Made aware of pelvic u/s results revealing hydrosalpinx. She recommended pelvic exam, cervical swab for CT/GC, and empiric treatment with doxycycline x 10-14 days. She recommended following up on UA results from [facility], which was unremarkable.     Pelvic exam performed with severe L adnexal tenderness. No discharge. Cervical swab sent, clean catch urine also ordered. Doxycycline empiric treatment started, and discussed with patient.     Review of Systems   Constitutional: Negative for chills and fever.   HENT: Negative for congestion.    Eyes: Negative for blurred vision and pain.   Respiratory: Negative for shortness of breath.    Cardiovascular: Positive for chest pain.   Gastrointestinal: Positive for  abdominal pain. Negative for diarrhea, heartburn, nausea and vomiting.   Genitourinary: Negative for dysuria and flank pain.   Musculoskeletal: Negative for myalgias.   Neurological: Negative for weakness and headaches.       Consultants/Specialty  Gynecology (Dr. Edwards)    Disposition  Inpatient    Quality Measures    Reviewed items::  Medications reviewed, Labs reviewed and Radiology images reviewed  Padron catheter::  No Padron  DVT prophylaxis pharmacological::  Not indicated at this time, ambulatory  DVT prophylaxis - mechanical:  SCDs  Antibiotics:  Treating active infection/contamination beyond 24 hours perioperative coverage          Physical Exam       Vitals:    09/14/17 1830 09/14/17 1950 09/15/17 0445 09/15/17 0804   BP:  115/59 116/67 127/65   Pulse: (!) 49 68 88 75   Resp:  18 16 18   Temp:  36.7 °C (98 °F) 36.7 °C (98.1 °F) 36.7 °C (98 °F)   SpO2: 93% 99% 98% 96%   Weight:       Height:         Body mass index is 30.01 kg/m².    Oxygen Therapy:  Pulse Oximetry: 96 %, O2 (LPM): 0, O2 Delivery: None (Room Air)    Physical Exam   Constitutional: She is oriented to person, place, and time and well-developed, well-nourished, and in no distress.   HENT:   Head: Normocephalic and atraumatic.   Eyes: Conjunctivae are normal.   Neck: Neck supple.   Cardiovascular: Normal rate and regular rhythm.    Pulmonary/Chest: Effort normal and breath sounds normal.   Abdominal: Soft.   TTP of epigastric region and LLQ   Genitourinary:   Genitourinary Comments: Bimanual pelvic exam revealed marked tenderness of L adnexal region. No tenderness of R adnexal region. No discharge.    Musculoskeletal: Normal range of motion.   Neurological: She is alert and oriented to person, place, and time.         Lab Data Review:         9/15/2017  1:34 PM    Recent Labs      09/14/17   1230  09/15/17   0332   SODIUM  137  137   POTASSIUM  3.2*  3.1*   CHLORIDE  104  107   CO2  20  19*   BUN  6*  5*   CREATININE  0.63  0.54   MAGNESIUM  1.8    --    CALCIUM  9.1  8.4*       Recent Labs      09/14/17   1230  09/15/17   0332   ALTSGPT  10  5   ASTSGOT  11*  9*   ALKPHOSPHAT  63  53   TBILIRUBIN  0.5  0.4   LIPASE  7*   --    GLUCOSE  133*  86       Recent Labs      09/14/17   1230  09/15/17   0332   RBC  4.94  4.32   HEMOGLOBIN  14.1  12.3   HEMATOCRIT  41.1  36.6*   PLATELETCT  333  316       Recent Labs      09/14/17   1230  09/15/17   0332   WBC  10.6  12.8*   NEUTSPOLYS  81.10*  65.80   LYMPHOCYTES  13.40*  27.40   MONOCYTES  4.00  5.10   EOSINOPHILS  0.50  0.90   BASOPHILS  0.40  0.30   ASTSGOT  11*  9*   ALTSGPT  10  5   ALKPHOSPHAT  63  53   TBILIRUBIN  0.5  0.4           Assessment/Plan     * Intractable vomiting with nausea and abdominal pain   Assessment & Plan    Presented with 6 day h/o intractable vomiting and nausea with diffuse abdominal pain, worst in LLQ. No h/o dysuria or diarrhea. No fevers. CT abdomen showed 2.3 x 2.5 x 3.9 cm left adnexal cystic with nonspecific free fluid in the pelvis and an ill-defined 1.7 cm hypodense lesion in the upper to midpole of the right kidney, which could represent focal pyelonephritis.UA neg leuk esterase and nitrite, +ketones. Pregnancy test negative. Procalcitonin neg. U/s today showed hydrosalpinx. Patient reports marked improvement of symptoms today.  Differential includes PID vs partially treated pyelonephritis less likely.   - Diet advanced to clears, continue IVF for now  - Supportive care with IV reglan  - GI PPX with pepcid injections  - IP Gyn Consult placed - recommend electrolyte repletion, empiric treatment with doxycycline, and GC/CT cervical swab.   - Doxycycline started for 14 day course. Ceftriaxone continued to cover for potential partially treated pylenephritis, as this cannot be ruled out  - F/u cervical swab and urince results for GC/CT  - F/u UCx, BCx           Hypokalemia   Assessment & Plan    K 3.2 on presentation, hypokalemia persists to 3.1 today despite repletion.   - Repleted  with additional 40 mEq K  - CTM chem, replete as needed

## 2017-09-15 NOTE — DIETARY
"Nutrition Services: Patient seen for unplanned weight loss prior to admission    32 y.o. female with admitting DX of Nausea & vomiting, now improved, renal mass  Pertinent History: Asthma,     Height: 162.6 cm (5' 4\")  Weight: 79.3 kg (174 lb 13.2 oz)  Body mass index is 30.01 kg/m².    Patient sleeping and did not awaken when I called her name aloud.  Nursing reported she has been eating all of her trays and expressing desire to advance diet.    Patient was on a clear liquid diet just advanced to regular as tolerated.  Labs, medications and past medical history reviewed.    Recommend:  Nutrition Representative will continue to see her for ongoing meal and snack preferences.  RD following.  "

## 2017-09-15 NOTE — NON-PROVIDER
Internal Medicine Medical Student Note    Name Rikki Toscano     1985   Age/Sex 32 y.o. female   MRN [id number]   Code Status Full     After 5PM or if no immediate response to page, please call for cross-coverage  Attending/Team: Norma See Patient List for primary contact information  Call [phone] to page after hours   1st Call - Day Intern (R1):    2nd Call - Day Sr. Resident (R2/R3):            Reason for interval visit  (Principal Problem)   Intractable vomiting with abdominal pain    Interval Problem Daily Status Update  (24 hours)   No acute overnight events. Patient reports feeling much better but still has nausea. No vomiting since arrived on floor last night. She describes diffuse abdominal pain. Would like to attempt to advance diet as nausea is under better control. Asked additional ob/gyn history. LMP . H/o PID in . Has had 2 children since then via C section and had tubal ligation after second child. Regular ob/gyn Dr. Hernando Hay.    Obtained UA results from [facility] which were unremarkable. No UCx done.  Pelvic US showed hydrosalpinx. Gynecologist consulted during admission was informed of the results and recommended pelvic exam with cervical swab and urine testing for gonorrhea and chlamydia with doxycycline treatment. Pelvic exam reported as L adnexal tenderness.  Radiology consulted regarding R renal hypodensity and they recommended MRI.    On return later in the afternoon patient reports ~6 episodes of watery/loose diarrhea since this morning. She was made aware of MRI recommendation and is agreeable. Patient describes inability to lay on back due to LLQ pain and TTP of R flank.    Review of Systems   Constitutional: Negative for chills, fever and malaise/fatigue.   HENT: Negative for congestion, hearing loss, sore throat and tinnitus.    Eyes: Negative for blurred vision and double vision.   Respiratory: Negative for cough, sputum production, shortness of breath and  wheezing.    Cardiovascular: Negative for chest pain, palpitations, orthopnea and leg swelling.   Gastrointestinal: Positive for abdominal pain, diarrhea and nausea. Negative for blood in stool, constipation and vomiting.   Genitourinary: Negative for dysuria, flank pain, frequency, hematuria and urgency.   Skin: Negative for rash.   Neurological: Negative for dizziness, tingling, tremors, sensory change, speech change, focal weakness, weakness and headaches.   Psychiatric/Behavioral: Negative for depression. The patient is not nervous/anxious.                Physical Exam       Vitals:    09/14/17 1830 09/14/17 1950 09/15/17 0445 09/15/17 0804   BP:  115/59 116/67 127/65   Pulse: (!) 49 68 88 75   Resp:  18 16 18   Temp:  36.7 °C (98 °F) 36.7 °C (98.1 °F) 36.7 °C (98 °F)   SpO2: 93% 99% 98% 96%   Weight:       Height:         Body mass index is 30.01 kg/m². Weight: 79.3 kg (174 lb 13.2 oz)  Oxygen Therapy:  Pulse Oximetry: 96 %, O2 (LPM): 0, O2 Delivery: None (Room Air)    Physical Exam   Constitutional: She is well-developed, well-nourished, and in no distress.   HENT:   Head: Normocephalic and atraumatic.   Mouth/Throat: Oropharynx is clear and moist.   Eyes: Conjunctivae and EOM are normal. Pupils are equal, round, and reactive to light.   Neck: Neck supple. No thyromegaly present.   Cardiovascular: Normal rate, regular rhythm and intact distal pulses.  Exam reveals no gallop and no friction rub.    No murmur heard.  Pulmonary/Chest: Effort normal and breath sounds normal. No respiratory distress. She has no wheezes. She has no rales.   Abdominal: Soft. Normal appearance and bowel sounds are normal. She exhibits no distension. There is no hepatosplenomegaly. There is tenderness in the right upper quadrant and left lower quadrant. There is no rigidity, no rebound, no guarding, no CVA tenderness, no tenderness at McBurney's point and negative Ewing's sign.   Lymphadenopathy:     She has no cervical adenopathy.              Assessment/Plan     1. Intractable vomiting with abdominal pain  - PID vs pyelo vs ovarian torsion vs hydrosalpinx  - Ovarian torsion less likely due to negative imaging  - Pyelo possible due to R renal findings on imaging and presentation to [facility] receiving bactrim treatment. UA negative but could be due to bactrim. Continue C3 to cover. MRI ordered to further investigate CT findings at recommendation of radiology.   - PID likely due to h/o PID in 2006 and positive chandelier sign on pelvic exam. Dr. Edwards (gynecology) recommends empiric treatment with doxycycline for 10-14 days. G/C results pending.  - Hydrosalpinx also likely due to findings on US and CT, h/o PID and previous tubal ligation, negative PCT. GYN informed.  - Continue IVF 83 mL/hr until PO intake resumes  - Ordered diet to resume as patient would like to try eating now that nausea is better  - Nausea control with metoclopramide. May be contributing to diarrhea

## 2017-09-15 NOTE — H&P
Internal Medicine Admitting History and Physical    Name Rikki Toscano     1985   Age/Sex 32 y.o. female   MRN [id number]   Code Status Full     After 5PM or if no immediate response to page, please call for cross-coverage  Attending/Team: Dr. Green/Madhav See Patient List for primary contact information  Call [phone] to page    1st Call - Day Intern (R1):   Dr. Sharp 2nd Call - Day Sr. Resident (R2/R3):   Dr. Rowland       Chief Complaint: Nausea/Vomiting x 6 days    HPI:  33 yo F with PMH asthma presents with  6 day history of nausea/vomiting. As per pt, symptoms began last Thursday. She states that she went to [facility] on Saturday and was diagnosed with a UTI and kidney infection and received antibiotics, zofran, and phenergan with some relief. On , her symptoms returned and have come and gone in waves. She has been able to eat and drink until today. Since yesterday, she reports vomiting 15 times and believes she had one episode this morning of bright red blood in vomit while at home. She has felt chills. She also reports diffuse severe abdominal pain, worst in LUQ, RUQ, and epigastric areas. As per patient, pain worsens in epigastric area with deep inspiration. Patient denies any recent NSAID use. She has taken tylenol for pain but no other OTC. Of note, last intercourse was 3 days ago with no pain during or after. No sick contacts. She denies any diarrhea/constipation/blood in     Review of Systems   Constitutional: Positive for fever. Negative for chills.   Respiratory: Negative for shortness of breath and wheezing.    Cardiovascular: Negative for chest pain, palpitations and PND.   Gastrointestinal: Positive for abdominal pain, nausea and vomiting. Negative for blood in stool, constipation and diarrhea.   Genitourinary: Negative for dysuria, flank pain and frequency.   Musculoskeletal: Negative for myalgias.   Skin: Negative for rash.             Past Medical History:   Past Medical  "History:   Diagnosis Date   • ASTHMA        Past Surgical History:  Past Surgical History:   Procedure Laterality Date   • GYN SURGERY  2010           Current Outpatient Medications:  Home Medications     Reviewed by Jennifer Lorenzo (Pharmacy [facility]) on 17 at 1720  Med List Status: Complete   Medication Last Dose Status   albuterol 108 (90 Base) MCG/ACT Aero Soln inhalation aerosol > 2 weeks Active   ketorolac (TORADOL) 10 MG Tab 9/10/2017 Active   ondansetron (ZOFRAN ODT) 4 MG TABLET DISPERSIBLE 2017 Active   sertraline (ZOLOFT) 50 MG Tab 2017 Active   sulfamethoxazole-trimethoprim (BACTRIM DS) 800-160 MG tablet 2017 Active                Medication Allergy/Sensitivities:  No Known Allergies      Family History:  No family history on file.    Social History:  Smokes 6 cigarettes per day since 17  MJ use last 10 days ago      Physical Exam     Vitals:    17 1600 17 1631 17 1701 17 1731   BP:       Pulse: (!) 48 62 66 71   Resp:       Temp:       SpO2: 90% 100% 100% 100%   Weight:       Height:         Body mass index is 30.01 kg/m².  /65   Pulse 71   Temp 36.4 °C (97.5 °F)   Resp 18   Ht 1.626 m (5' 4\")   Wt 79.3 kg (174 lb 13.2 oz)   LMP 2017 (Exact Date)   SpO2 100%   BMI 30.01 kg/m²   O2 therapy: Pulse Oximetry: 100 %, O2 Delivery: None (Room Air)    Physical Exam   Constitutional: She is oriented to person, place, and time. She appears distressed.   HENT:   Head: Normocephalic and atraumatic.   Mouth/Throat: Oropharynx is clear and moist.   Eyes: Pupils are equal, round, and reactive to light.   Neck: Neck supple.   Cardiovascular: Normal rate and regular rhythm.  Exam reveals friction rub. Exam reveals no gallop.    No murmur heard.  Pulmonary/Chest: Effort normal and breath sounds normal. No respiratory distress. She has no wheezes. She has no rales.   Abdominal: Bowel sounds are normal. She exhibits distension. She exhibits no " mass. There is tenderness. There is no CVA tenderness.   Several 1-2cm bruises on abdomen  TTP worse in LLQ   Genitourinary:   Genitourinary Comments: No CVA tenderness   Musculoskeletal: Normal range of motion.   Neurological: She is alert and oriented to person, place, and time.       Room vitals: P 75, 136/70, SpO2 99%, RR 24 (taken during several episodes of emesis)      Data Review       Old Records Request:   Completed  Current Records review and summary: Completed    Lab Data Review:  Recent Results (from the past 24 hour(s))   URINALYSIS CULTURE, IF INDICATED    Collection Time: 09/14/17 12:20 PM   Result Value Ref Range    Micro Urine Req Microscopic     Color Straw     Character Clear     Specific Gravity 1.010 <1.035    Ph 8.0 5.0 - 8.0    Glucose Trace (A) Negative mg/dL    Ketones 100 (A) Negative mg/dL    Protein Negative Negative mg/dL    Bilirubin Negative Negative    Urobilinogen, Urine 8.0 Negative    Nitrite Negative Negative    Leukocyte Esterase Negative Negative    Occult Blood Trace (A) Negative    Culture Indicated No UA Culture   URINE DRUG SCREEN    Collection Time: 09/14/17 12:20 PM   Result Value Ref Range    Amphetamines Urine Negative Negative    Barbiturates Negative Negative    Benzodiazepines Negative Negative    Cocaine Metabolite Negative Negative    Methadone Negative Negative    Opiates Negative Negative    Oxycodone Negative Negative    Phencyclidine -Pcp Negative Negative    Propoxyphene Negative Negative    Cannabinoid Metab Positive (A) Negative   URINE MICROSCOPIC (W/UA)    Collection Time: 09/14/17 12:20 PM   Result Value Ref Range    WBC 0-2 /hpf    RBC 2-5 (A) /hpf    Bacteria Negative None /hpf    Epithelial Cells Few /hpf    Hyaline Cast 0-2 /lpf   CBC WITH DIFFERENTIAL    Collection Time: 09/14/17 12:30 PM   Result Value Ref Range    WBC 10.6 4.8 - 10.8 K/uL    RBC 4.94 4.20 - 5.40 M/uL    Hemoglobin 14.1 12.0 - 16.0 g/dL    Hematocrit 41.1 37.0 - 47.0 %    MCV 83.2  81.4 - 97.8 fL    MCH 28.5 27.0 - 33.0 pg    MCHC 34.3 33.6 - 35.0 g/dL    RDW 37.7 35.9 - 50.0 fL    Platelet Count 333 164 - 446 K/uL    MPV 9.6 9.0 - 12.9 fL    Neutrophils-Polys 81.10 (H) 44.00 - 72.00 %    Lymphocytes 13.40 (L) 22.00 - 41.00 %    Monocytes 4.00 0.00 - 13.40 %    Eosinophils 0.50 0.00 - 6.90 %    Basophils 0.40 0.00 - 1.80 %    Immature Granulocytes 0.60 0.00 - 0.90 %    Nucleated RBC 0.00 /100 WBC    Neutrophils (Absolute) 8.63 (H) 2.00 - 7.15 K/uL    Lymphs (Absolute) 1.42 1.00 - 4.80 K/uL    Monos (Absolute) 0.42 0.00 - 0.85 K/uL    Eos (Absolute) 0.05 0.00 - 0.51 K/uL    Baso (Absolute) 0.04 0.00 - 0.12 K/uL    Immature Granulocytes (abs) 0.06 0.00 - 0.11 K/uL    NRBC (Absolute) 0.00 K/uL   COMP METABOLIC PANEL    Collection Time: 09/14/17 12:30 PM   Result Value Ref Range    Sodium 137 135 - 145 mmol/L    Potassium 3.2 (L) 3.6 - 5.5 mmol/L    Chloride 104 96 - 112 mmol/L    Co2 20 20 - 33 mmol/L    Anion Gap 13.0 (H) 0.0 - 11.9    Glucose 133 (H) 65 - 99 mg/dL    Bun 6 (L) 8 - 22 mg/dL    Creatinine 0.63 0.50 - 1.40 mg/dL    Calcium 9.1 8.5 - 10.5 mg/dL    AST(SGOT) 11 (L) 12 - 45 U/L    ALT(SGPT) 10 2 - 50 U/L    Alkaline Phosphatase 63 30 - 99 U/L    Total Bilirubin 0.5 0.1 - 1.5 mg/dL    Albumin 4.2 3.2 - 4.9 g/dL    Total Protein 7.6 6.0 - 8.2 g/dL    Globulin 3.4 1.9 - 3.5 g/dL    A-G Ratio 1.2 g/dL   LIPASE    Collection Time: 09/14/17 12:30 PM   Result Value Ref Range    Lipase 7 (L) 11 - 82 U/L   LACTIC ACID    Collection Time: 09/14/17 12:30 PM   Result Value Ref Range    Lactic Acid 1.6 0.5 - 2.0 mmol/L   HCG QUAL SERUM    Collection Time: 09/14/17 12:30 PM   Result Value Ref Range    Beta-Hcg Qualitative Serum Negative Negative   ESTIMATED GFR    Collection Time: 09/14/17 12:30 PM   Result Value Ref Range    GFR If African American >60 >60 mL/min/1.73 m 2    GFR If Non African American >60 >60 mL/min/1.73 m 2   EKG (NOW)    Collection Time: 09/14/17 12:56 PM   Result Value Ref  Range    Report       [facility] Emergency Dept[facility]    Test Date:  2017  Pt Name:    BATSHEVA HAIRSTON                   Department: ER  MRN:        [id number]                      Room:       BL 17  Gender:     F                            Technician: [id number]  :        1985                   Requested By:LALIT HUSSEIN  Order #:    [id number]                    Reading MD:    Measurements  Intervals                                Axis  Rate:       43                           P:          49  MN:         136                          QRS:        59  QRSD:       96                           T:          45  QT:         496  QTc:        420    Interpretive Statements  SINUS BRADYCARDIA  No previous ECG available for comparison         Imaging/Procedures Review:    ndependant Imaging Review: Completed  CT-ABDOMEN-PELVIS WITH   Final Result      2.3 x 2.5 x 3.9 cm left adnexal cystic lesion for which further evaluation with pelvic ultrasound is recommended.      Small amount of nonspecific free fluid in the pelvis.      Ill-defined 1.7 cm hypodense lesion in the upper to midpole of the right kidney could represent focal pyelonephritis. Solid mass on excluded. Further evaluation with renal protocol MRI is recommended.      Evaluation of the colon limited by relative decompression. Mild wall thickening difficult to exclude.         US-GYN-PELVIS TRANSVAGINAL    (Results Pending)        EKG SINUS BRADYCARDIA QTc 420             Assessment/Plan     Hypokalemia   Assessment & Plan    K 3.2 on presentation, likely due to vomiting.  - Repleted with IV K 30 mEq  - CTM chem, replete as needed  - IVF         Intractable vomiting with nausea and abdominal pain   Assessment & Plan    6 day h/o intractable vomiting and nausea with diffuse abdominal pain. No h/o dysuria or diarrhea. No fevers. On exam, patient appeared acutely ill and wretching. Diffuse TTP of abdomen, worst in LLQ. Emesis appeared slightly  blood-tinged with epithelial cells. No CVA tenderness. CT abdomen showed 2.3 x 2.5 x 3.9 cm left adnexal cystic with nonspecific free fluid in the pelvis and an ill-defined 1.7 cm hypodense lesion in the upper to midpole of the right kidney, which could represent focal pyelonephritis. Solid mass on excluded. Further evaluation with renal protocol MRI was recommended. No leukocytosis. UA neg leuk esterase and nitrite, +ketones. Pregnancy test negative. Differential includes ruptures L adnexal cyst vs. Pelvic abscess, pyelonephritis less likely.   - NPO for now, start IVF   - Supportive care with IV reglan  - GI PPX with pepcid injections  - IP Gyn Consult placed, f/u recs  - F/u pelvic ultrasound  - F/u procalcitonin - if high, empirically treat with antibiotics - consider C3  - F/u UCx, BCx  - F/u repeat CMP             Anticipated Hospital stay:  >2 midnights        Quality Measures    Reviewed items::  EKG reviewed, Labs reviewed, Medications reviewed and Radiology images reviewed  Padron catheter::  No Padron  DVT prophylaxis pharmacological::  Heparin  DVT prophylaxis - mechanical:  SCDs

## 2017-09-16 ENCOUNTER — APPOINTMENT (OUTPATIENT)
Dept: RADIOLOGY | Facility: MEDICAL CENTER | Age: 32
DRG: 689 | End: 2017-09-16
Attending: STUDENT IN AN ORGANIZED HEALTH CARE EDUCATION/TRAINING PROGRAM
Payer: MEDICAID

## 2017-09-16 PROBLEM — E87.6 HYPOKALEMIA: Status: RESOLVED | Noted: 2017-09-14 | Resolved: 2017-09-16

## 2017-09-16 LAB
ALBUMIN SERPL BCP-MCNC: 3.5 G/DL (ref 3.2–4.9)
ALBUMIN/GLOB SERPL: 1.3 G/DL
ALP SERPL-CCNC: 49 U/L (ref 30–99)
ALT SERPL-CCNC: 6 U/L (ref 2–50)
ANION GAP SERPL CALC-SCNC: 10 MMOL/L (ref 0–11.9)
AST SERPL-CCNC: 8 U/L (ref 12–45)
BASOPHILS # BLD AUTO: 0.3 % (ref 0–1.8)
BASOPHILS # BLD: 0.03 K/UL (ref 0–0.12)
BILIRUB SERPL-MCNC: 0.3 MG/DL (ref 0.1–1.5)
BUN SERPL-MCNC: 5 MG/DL (ref 8–22)
CALCIUM SERPL-MCNC: 8.6 MG/DL (ref 8.5–10.5)
CHLORIDE SERPL-SCNC: 106 MMOL/L (ref 96–112)
CO2 SERPL-SCNC: 23 MMOL/L (ref 20–33)
CREAT SERPL-MCNC: 0.52 MG/DL (ref 0.5–1.4)
EOSINOPHIL # BLD AUTO: 0.15 K/UL (ref 0–0.51)
EOSINOPHIL NFR BLD: 1.7 % (ref 0–6.9)
ERYTHROCYTE [DISTWIDTH] IN BLOOD BY AUTOMATED COUNT: 40.5 FL (ref 35.9–50)
GFR SERPL CREATININE-BSD FRML MDRD: >60 ML/MIN/1.73 M 2
GLOBULIN SER CALC-MCNC: 2.7 G/DL (ref 1.9–3.5)
GLUCOSE SERPL-MCNC: 86 MG/DL (ref 65–99)
HCT VFR BLD AUTO: 35.5 % (ref 37–47)
HGB BLD-MCNC: 12.3 G/DL (ref 12–16)
IMM GRANULOCYTES # BLD AUTO: 0.06 K/UL (ref 0–0.11)
IMM GRANULOCYTES NFR BLD AUTO: 0.7 % (ref 0–0.9)
LYMPHOCYTES # BLD AUTO: 3.03 K/UL (ref 1–4.8)
LYMPHOCYTES NFR BLD: 34.4 % (ref 22–41)
MCH RBC QN AUTO: 29.6 PG (ref 27–33)
MCHC RBC AUTO-ENTMCNC: 34.6 G/DL (ref 33.6–35)
MCV RBC AUTO: 85.3 FL (ref 81.4–97.8)
MONOCYTES # BLD AUTO: 0.62 K/UL (ref 0–0.85)
MONOCYTES NFR BLD AUTO: 7 % (ref 0–13.4)
NEUTROPHILS # BLD AUTO: 4.91 K/UL (ref 2–7.15)
NEUTROPHILS NFR BLD: 55.9 % (ref 44–72)
NRBC # BLD AUTO: 0 K/UL
NRBC BLD AUTO-RTO: 0 /100 WBC
PLATELET # BLD AUTO: 282 K/UL (ref 164–446)
PMV BLD AUTO: 10 FL (ref 9–12.9)
POTASSIUM SERPL-SCNC: 3.8 MMOL/L (ref 3.6–5.5)
PROT SERPL-MCNC: 6.2 G/DL (ref 6–8.2)
RBC # BLD AUTO: 4.16 M/UL (ref 4.2–5.4)
SODIUM SERPL-SCNC: 139 MMOL/L (ref 135–145)
WBC # BLD AUTO: 8.8 K/UL (ref 4.8–10.8)

## 2017-09-16 PROCEDURE — 80053 COMPREHEN METABOLIC PANEL: CPT

## 2017-09-16 PROCEDURE — 700111 HCHG RX REV CODE 636 W/ 250 OVERRIDE (IP): Performed by: HOSPITALIST

## 2017-09-16 PROCEDURE — 90732 PPSV23 VACC 2 YRS+ SUBQ/IM: CPT | Performed by: INTERNAL MEDICINE

## 2017-09-16 PROCEDURE — 700105 HCHG RX REV CODE 258: Performed by: STUDENT IN AN ORGANIZED HEALTH CARE EDUCATION/TRAINING PROGRAM

## 2017-09-16 PROCEDURE — 85025 COMPLETE CBC W/AUTO DIFF WBC: CPT

## 2017-09-16 PROCEDURE — 36415 COLL VENOUS BLD VENIPUNCTURE: CPT

## 2017-09-16 PROCEDURE — 700117 HCHG RX CONTRAST REV CODE 255: Performed by: STUDENT IN AN ORGANIZED HEALTH CARE EDUCATION/TRAINING PROGRAM

## 2017-09-16 PROCEDURE — A9579 GAD-BASE MR CONTRAST NOS,1ML: HCPCS | Performed by: STUDENT IN AN ORGANIZED HEALTH CARE EDUCATION/TRAINING PROGRAM

## 2017-09-16 PROCEDURE — 74183 MRI ABD W/O CNTR FLWD CNTR: CPT

## 2017-09-16 PROCEDURE — 700111 HCHG RX REV CODE 636 W/ 250 OVERRIDE (IP): Performed by: INTERNAL MEDICINE

## 2017-09-16 PROCEDURE — 700101 HCHG RX REV CODE 250: Performed by: STUDENT IN AN ORGANIZED HEALTH CARE EDUCATION/TRAINING PROGRAM

## 2017-09-16 PROCEDURE — 700102 HCHG RX REV CODE 250 W/ 637 OVERRIDE(OP): Performed by: INTERNAL MEDICINE

## 2017-09-16 PROCEDURE — A9270 NON-COVERED ITEM OR SERVICE: HCPCS | Performed by: INTERNAL MEDICINE

## 2017-09-16 PROCEDURE — 3E0234Z INTRODUCTION OF SERUM, TOXOID AND VACCINE INTO MUSCLE, PERCUTANEOUS APPROACH: ICD-10-PCS | Performed by: INTERNAL MEDICINE

## 2017-09-16 PROCEDURE — A9270 NON-COVERED ITEM OR SERVICE: HCPCS | Performed by: STUDENT IN AN ORGANIZED HEALTH CARE EDUCATION/TRAINING PROGRAM

## 2017-09-16 PROCEDURE — 770006 HCHG ROOM/CARE - MED/SURG/GYN SEMI*

## 2017-09-16 PROCEDURE — 700111 HCHG RX REV CODE 636 W/ 250 OVERRIDE (IP): Performed by: STUDENT IN AN ORGANIZED HEALTH CARE EDUCATION/TRAINING PROGRAM

## 2017-09-16 PROCEDURE — 700102 HCHG RX REV CODE 250 W/ 637 OVERRIDE(OP): Performed by: STUDENT IN AN ORGANIZED HEALTH CARE EDUCATION/TRAINING PROGRAM

## 2017-09-16 PROCEDURE — 90471 IMMUNIZATION ADMIN: CPT

## 2017-09-16 PROCEDURE — 99232 SBSQ HOSP IP/OBS MODERATE 35: CPT | Mod: GC | Performed by: INTERNAL MEDICINE

## 2017-09-16 RX ORDER — METOCLOPRAMIDE HYDROCHLORIDE 5 MG/ML
10 INJECTION INTRAMUSCULAR; INTRAVENOUS ONCE
Status: DISCONTINUED | OUTPATIENT
Start: 2017-09-16 | End: 2017-09-16

## 2017-09-16 RX ORDER — OMEPRAZOLE 20 MG/1
20 CAPSULE, DELAYED RELEASE ORAL DAILY
Status: DISCONTINUED | OUTPATIENT
Start: 2017-09-16 | End: 2017-09-19 | Stop reason: HOSPADM

## 2017-09-16 RX ORDER — METOCLOPRAMIDE HYDROCHLORIDE 5 MG/ML
10 INJECTION INTRAMUSCULAR; INTRAVENOUS EVERY 6 HOURS PRN
Status: DISCONTINUED | OUTPATIENT
Start: 2017-09-16 | End: 2017-09-18

## 2017-09-16 RX ORDER — HYDROMORPHONE HYDROCHLORIDE 2 MG/1
2 TABLET ORAL EVERY 4 HOURS PRN
Status: DISCONTINUED | OUTPATIENT
Start: 2017-09-16 | End: 2017-09-19 | Stop reason: HOSPADM

## 2017-09-16 RX ORDER — ONDANSETRON 2 MG/ML
4 INJECTION INTRAMUSCULAR; INTRAVENOUS ONCE
Status: COMPLETED | OUTPATIENT
Start: 2017-09-16 | End: 2017-09-16

## 2017-09-16 RX ORDER — ONDANSETRON 2 MG/ML
4 INJECTION INTRAMUSCULAR; INTRAVENOUS EVERY 4 HOURS PRN
Status: DISCONTINUED | OUTPATIENT
Start: 2017-09-16 | End: 2017-09-19 | Stop reason: HOSPADM

## 2017-09-16 RX ORDER — ALPRAZOLAM 0.5 MG/1
0.5 TABLET ORAL ONCE
Status: COMPLETED | OUTPATIENT
Start: 2017-09-16 | End: 2017-09-16

## 2017-09-16 RX ADMIN — ONDANSETRON 4 MG: 2 INJECTION INTRAMUSCULAR; INTRAVENOUS at 18:30

## 2017-09-16 RX ADMIN — ONDANSETRON 4 MG: 2 INJECTION INTRAMUSCULAR; INTRAVENOUS at 08:00

## 2017-09-16 RX ADMIN — FAMOTIDINE 20 MG: 10 INJECTION, SOLUTION INTRAVENOUS at 22:13

## 2017-09-16 RX ADMIN — SERTRALINE 50 MG: 50 TABLET, FILM COATED ORAL at 21:38

## 2017-09-16 RX ADMIN — PNEUMOCOCCAL VACCINE POLYVALENT 25 MCG
25; 25; 25; 25; 25; 25; 25; 25; 25; 25; 25; 25; 25; 25; 25; 25; 25; 25; 25; 25; 25; 25; 25 INJECTION, SOLUTION INTRAMUSCULAR; SUBCUTANEOUS at 05:27

## 2017-09-16 RX ADMIN — DOXYCYCLINE 100 MG: 100 INJECTION, POWDER, LYOPHILIZED, FOR SOLUTION INTRAVENOUS at 11:13

## 2017-09-16 RX ADMIN — ALPRAZOLAM 0.5 MG: 0.5 TABLET ORAL at 07:45

## 2017-09-16 RX ADMIN — HYDROMORPHONE HYDROCHLORIDE 0.2 MG: 1 INJECTION, SOLUTION INTRAMUSCULAR; INTRAVENOUS; SUBCUTANEOUS at 08:29

## 2017-09-16 RX ADMIN — HYDROMORPHONE HYDROCHLORIDE 0.5 MG: 1 INJECTION, SOLUTION INTRAMUSCULAR; INTRAVENOUS; SUBCUTANEOUS at 22:12

## 2017-09-16 RX ADMIN — HYDROMORPHONE HYDROCHLORIDE 0.5 MG: 1 INJECTION, SOLUTION INTRAMUSCULAR; INTRAVENOUS; SUBCUTANEOUS at 18:30

## 2017-09-16 RX ADMIN — FAMOTIDINE 20 MG: 10 INJECTION, SOLUTION INTRAVENOUS at 10:14

## 2017-09-16 RX ADMIN — CEFTRIAXONE 2 G: 2 INJECTION, POWDER, FOR SOLUTION INTRAMUSCULAR; INTRAVENOUS at 21:38

## 2017-09-16 RX ADMIN — METOCLOPRAMIDE 10 MG: 5 INJECTION, SOLUTION INTRAMUSCULAR; INTRAVENOUS at 05:26

## 2017-09-16 RX ADMIN — HYDROMORPHONE HYDROCHLORIDE 0.5 MG: 1 INJECTION, SOLUTION INTRAMUSCULAR; INTRAVENOUS; SUBCUTANEOUS at 14:44

## 2017-09-16 RX ADMIN — GADODIAMIDE 20 ML: 287 INJECTION INTRAVENOUS at 10:27

## 2017-09-16 RX ADMIN — METOCLOPRAMIDE 10 MG: 5 INJECTION, SOLUTION INTRAMUSCULAR; INTRAVENOUS at 14:45

## 2017-09-16 RX ADMIN — DOXYCYCLINE 100 MG: 100 INJECTION, POWDER, LYOPHILIZED, FOR SOLUTION INTRAVENOUS at 22:17

## 2017-09-16 ASSESSMENT — ENCOUNTER SYMPTOMS
HEARTBURN: 0
COUGH: 1
DIARRHEA: 0
MYALGIAS: 0
SHORTNESS OF BREATH: 0
CHILLS: 0
BLOOD IN STOOL: 0
VOMITING: 1
PALPITATIONS: 0
NAUSEA: 1
WHEEZING: 0
ABDOMINAL PAIN: 1
FEVER: 0

## 2017-09-16 ASSESSMENT — PAIN SCALES - GENERAL
PAINLEVEL_OUTOF10: 6

## 2017-09-16 ASSESSMENT — LIFESTYLE VARIABLES: DO YOU DRINK ALCOHOL: NO

## 2017-09-16 NOTE — PROGRESS NOTES
Report received. Assumed care, assessment complete. Pt is A & O x 4.  Pt denies having any pain at this time. Fall precautions and appropriate signs in place.RN extension number provided. Pt educated regarding fall precautions. Bed alarm not indicated. Pt denies any additional needs at this time. Call light within reach.

## 2017-09-16 NOTE — PROGRESS NOTES
Internal Medicine Interval Note    Name Rikki Toscano     1985   Age/Sex 32 y.o. female   MRN [id number]   Code Status Full     After 5PM or if no immediate response to page, please call for cross-coverage  Attending/Team: Dr. Green/Madhav See Patient List for primary contact information  Call [phone] to page    1st Call - Day Intern (R1):   Dr. Sharp 2nd Call - Day Sr. Resident (R2/R3):   Dr. Rowland         Reason for interval visit  (Principal Problem)   Intractable vomiting with nausea    Interval Problem Daily Status Update  (24 hours)   Pt seen and examined at bedside. She is in distress and very anxious. She has been vomiting since this AM. She has the bag with her at bedside and it contains speckled dark red blood. She states she is in excruciating pain, diffusely in her abdomen. No fevers/chills.     Discussion with mother regarding need for further assessment. Encouraged her to keep pt calm as she is wretching and this can increase risk for GI  Bleeding if it persists. Plan to control nausea, pain, and emesis discussed.    Initial exam performed at 7:30 this AM. Returned to re-evaluate pt after she had an MRI Renal Protocol performed, around 10 am. Pt stated that the pain and nausea still persisted. Pt made aware that the MRI will be read this afternoon.      Review of Systems   Constitutional: Negative for chills and fever.   Respiratory: Positive for cough. Negative for shortness of breath and wheezing.    Cardiovascular: Negative for chest pain, palpitations and leg swelling.   Gastrointestinal: Positive for abdominal pain, nausea and vomiting. Negative for blood in stool, diarrhea and heartburn.        Diffuse abdominal pain, worst in epigastric region as per patient    Genitourinary: Negative for dysuria.   Musculoskeletal: Negative for myalgias.   Skin: Negative for rash.       Consultants/Specialty  Gyn    Disposition  Inpatient    Quality Measures    Reviewed items::   Medications reviewed and Labs reviewed  Padron catheter::  No Padron  DVT prophylaxis pharmacological::  Not indicated at this time, ambulatory  DVT prophylaxis - mechanical:  SCDs  Ulcer Prophylaxis::  Yes  Antibiotics:  Treating active infection/contamination beyond 24 hours perioperative coverage          Physical Exam       Vitals:    09/15/17 2000 09/16/17 0100 09/16/17 0400 09/16/17 0800   BP: 132/92  130/87 139/77   Pulse: 77  (!) 53 (!) 54   Resp: 18  18 18   Temp: 36.8 °C (98.2 °F)  36.5 °C (97.7 °F) 36.5 °C (97.7 °F)   SpO2: 98%  99% 100%   Weight:  80.4 kg (177 lb 4 oz)     Height:         Body mass index is 30.42 kg/m². Weight: 80.4 kg (177 lb 4 oz)  Oxygen Therapy:  Pulse Oximetry: 100 %, O2 (LPM): 0, O2 Delivery: None (Room Air)    Physical Exam   Constitutional: She is oriented to person, place, and time. She appears distressed.   Tearful.    HENT:   Head: Normocephalic and atraumatic.   Eyes: Conjunctivae are normal.   Cardiovascular: Normal rate, regular rhythm and normal heart sounds.    Pulmonary/Chest: Effort normal and breath sounds normal.   Abdominal: Soft.   Diffuse TTP of abdomen.    Musculoskeletal: Normal range of motion.   Neurological: She is alert and oriented to person, place, and time.         Lab Data Review:         9/16/2017  9:27 AM    Recent Labs      09/14/17   1230  09/15/17   0332  09/16/17   0346   SODIUM  137  137  139   POTASSIUM  3.2*  3.1*  3.8   CHLORIDE  104  107  106   CO2  20  19*  23   BUN  6*  5*  5*   CREATININE  0.63  0.54  0.52   MAGNESIUM  1.8   --    --    CALCIUM  9.1  8.4*  8.6       Recent Labs      09/14/17   1230  09/15/17   0332  09/16/17   0346   ALTSGPT  10  5  6   ASTSGOT  11*  9*  8*   ALKPHOSPHAT  63  53  49   TBILIRUBIN  0.5  0.4  0.3   LIPASE  7*   --    --    GLUCOSE  133*  86  86       Recent Labs      09/14/17   1230  09/15/17   0332  09/16/17   0346   RBC  4.94  4.32  4.16*   HEMOGLOBIN  14.1  12.3  12.3   HEMATOCRIT  41.1  36.6*  35.5*    PLATELETCT  333  316  282       Recent Labs      09/14/17   1230  09/15/17   0332  09/16/17   0346   WBC  10.6  12.8*  8.8   NEUTSPOLYS  81.10*  65.80  55.90   LYMPHOCYTES  13.40*  27.40  34.40   MONOCYTES  4.00  5.10  7.00   EOSINOPHILS  0.50  0.90  1.70   BASOPHILS  0.40  0.30  0.30   ASTSGOT  11*  9*  8*   ALTSGPT  10  5  6   ALKPHOSPHAT  63  53  49   TBILIRUBIN  0.5  0.4  0.3           Assessment/Plan     * Intractable vomiting with nausea and abdominal pain   Assessment & Plan    Presented with 6 day h/o intractable vomiting and nausea with diffuse abdominal pain, worst in LLQ. No h/o dysuria or diarrhea. No fevers. CT abdomen showed 2.3 x 2.5 x 3.9 cm left adnexal cystic with nonspecific free fluid in the pelvis and an ill-defined 1.7 cm hypodense lesion in the upper to midpole of the right kidney, which could represent focal pyelonephritis.UA neg leuk esterase and nitrite, +ketones. Pregnancy test negative. Procalcitonin neg. Bcx and UCx negx2.  Urine GC/CT neg. U/s today showed hydrosalpinx. Patient reported improvement yesterday, but emesis resumed today.  Differential includes PID vs partially treated pyelonephritis less likely.   - NPO with sips for now  - Supportive care with IV zofran  - GI PPX with pepcid injections   - IP Gyn Consult placed - recommend electrolyte repletion, empiric treatment with doxycycline.   - Doxycycline started for 14 day course on 9/15. Will be administered IV for now. Ceftriaxone continued to cover for potential partially treated pylenephritis, as this cannot be ruled out.  - F/u MRI renal protocol read, performed this AM.   - Dilaudid 0.2-0.5 mg IV q 3 prn and dilaudid 2 mg PO q 4 PRN added with hold parameters  - C/w reglan and zofran prn nausea  - C/w IVF  - If pain acutely worsens, consider repeat pelvic u/s

## 2017-09-16 NOTE — CARE PLAN
Problem: Knowledge Deficit  Goal: Knowledge of disease process/condition, treatment plan, diagnostic tests, and medications will improve    Intervention: Explain information regarding disease process/condition, treatment plan, diagnostic tests, and medications and document in education  Pt updated on POC, pt to have MRI in AM       Problem: Pain Management  Goal: Pain level will decrease to patient's comfort goal    Intervention: Follow pain managment plan developed in collaboration with patient and Interdisciplinary Team  Abdominal pain managed with prn Tylenol

## 2017-09-17 LAB
ALBUMIN SERPL BCP-MCNC: 3.6 G/DL (ref 3.2–4.9)
ALBUMIN/GLOB SERPL: 1.2 G/DL
ALP SERPL-CCNC: 48 U/L (ref 30–99)
ALT SERPL-CCNC: 5 U/L (ref 2–50)
ANION GAP SERPL CALC-SCNC: 9 MMOL/L (ref 0–11.9)
AST SERPL-CCNC: 7 U/L (ref 12–45)
BACTERIA UR CULT: NORMAL
BASOPHILS # BLD AUTO: 0.6 % (ref 0–1.8)
BASOPHILS # BLD: 0.06 K/UL (ref 0–0.12)
BILIRUB SERPL-MCNC: 0.4 MG/DL (ref 0.1–1.5)
BUN SERPL-MCNC: 5 MG/DL (ref 8–22)
CALCIUM SERPL-MCNC: 8.7 MG/DL (ref 8.5–10.5)
CHLORIDE SERPL-SCNC: 104 MMOL/L (ref 96–112)
CO2 SERPL-SCNC: 22 MMOL/L (ref 20–33)
CREAT SERPL-MCNC: 0.51 MG/DL (ref 0.5–1.4)
EOSINOPHIL # BLD AUTO: 0.14 K/UL (ref 0–0.51)
EOSINOPHIL NFR BLD: 1.4 % (ref 0–6.9)
ERYTHROCYTE [DISTWIDTH] IN BLOOD BY AUTOMATED COUNT: 40.6 FL (ref 35.9–50)
GFR SERPL CREATININE-BSD FRML MDRD: >60 ML/MIN/1.73 M 2
GLOBULIN SER CALC-MCNC: 3.1 G/DL (ref 1.9–3.5)
GLUCOSE SERPL-MCNC: 81 MG/DL (ref 65–99)
HCT VFR BLD AUTO: 38.1 % (ref 37–47)
HGB BLD-MCNC: 12.7 G/DL (ref 12–16)
IMM GRANULOCYTES # BLD AUTO: 0.08 K/UL (ref 0–0.11)
IMM GRANULOCYTES NFR BLD AUTO: 0.8 % (ref 0–0.9)
LYMPHOCYTES # BLD AUTO: 3.39 K/UL (ref 1–4.8)
LYMPHOCYTES NFR BLD: 33.1 % (ref 22–41)
MCH RBC QN AUTO: 28.8 PG (ref 27–33)
MCHC RBC AUTO-ENTMCNC: 33.3 G/DL (ref 33.6–35)
MCV RBC AUTO: 86.4 FL (ref 81.4–97.8)
MONOCYTES # BLD AUTO: 0.77 K/UL (ref 0–0.85)
MONOCYTES NFR BLD AUTO: 7.5 % (ref 0–13.4)
NEUTROPHILS # BLD AUTO: 5.79 K/UL (ref 2–7.15)
NEUTROPHILS NFR BLD: 56.6 % (ref 44–72)
NRBC # BLD AUTO: 0 K/UL
NRBC BLD AUTO-RTO: 0 /100 WBC
PLATELET # BLD AUTO: 277 K/UL (ref 164–446)
PMV BLD AUTO: 9.4 FL (ref 9–12.9)
POTASSIUM SERPL-SCNC: 3 MMOL/L (ref 3.6–5.5)
PROT SERPL-MCNC: 6.7 G/DL (ref 6–8.2)
RBC # BLD AUTO: 4.41 M/UL (ref 4.2–5.4)
SIGNIFICANT IND 70042: NORMAL
SITE SITE: NORMAL
SODIUM SERPL-SCNC: 135 MMOL/L (ref 135–145)
SOURCE SOURCE: NORMAL
WBC # BLD AUTO: 10.2 K/UL (ref 4.8–10.8)

## 2017-09-17 PROCEDURE — A9270 NON-COVERED ITEM OR SERVICE: HCPCS | Performed by: STUDENT IN AN ORGANIZED HEALTH CARE EDUCATION/TRAINING PROGRAM

## 2017-09-17 PROCEDURE — 700111 HCHG RX REV CODE 636 W/ 250 OVERRIDE (IP): Performed by: STUDENT IN AN ORGANIZED HEALTH CARE EDUCATION/TRAINING PROGRAM

## 2017-09-17 PROCEDURE — 700111 HCHG RX REV CODE 636 W/ 250 OVERRIDE (IP): Performed by: HOSPITALIST

## 2017-09-17 PROCEDURE — 770006 HCHG ROOM/CARE - MED/SURG/GYN SEMI*

## 2017-09-17 PROCEDURE — 99232 SBSQ HOSP IP/OBS MODERATE 35: CPT | Mod: GC | Performed by: INTERNAL MEDICINE

## 2017-09-17 PROCEDURE — 85025 COMPLETE CBC W/AUTO DIFF WBC: CPT

## 2017-09-17 PROCEDURE — 80053 COMPREHEN METABOLIC PANEL: CPT

## 2017-09-17 PROCEDURE — 700102 HCHG RX REV CODE 250 W/ 637 OVERRIDE(OP): Performed by: STUDENT IN AN ORGANIZED HEALTH CARE EDUCATION/TRAINING PROGRAM

## 2017-09-17 PROCEDURE — 36415 COLL VENOUS BLD VENIPUNCTURE: CPT

## 2017-09-17 PROCEDURE — 700105 HCHG RX REV CODE 258: Performed by: STUDENT IN AN ORGANIZED HEALTH CARE EDUCATION/TRAINING PROGRAM

## 2017-09-17 PROCEDURE — 700101 HCHG RX REV CODE 250: Performed by: HOSPITALIST

## 2017-09-17 PROCEDURE — 700101 HCHG RX REV CODE 250: Performed by: STUDENT IN AN ORGANIZED HEALTH CARE EDUCATION/TRAINING PROGRAM

## 2017-09-17 RX ORDER — ONDANSETRON 4 MG/1
4 TABLET, ORALLY DISINTEGRATING ORAL EVERY 4 HOURS PRN
Status: DISCONTINUED | OUTPATIENT
Start: 2017-09-17 | End: 2017-09-19 | Stop reason: HOSPADM

## 2017-09-17 RX ORDER — POTASSIUM CHLORIDE 7.45 MG/ML
10 INJECTION INTRAVENOUS
Status: COMPLETED | OUTPATIENT
Start: 2017-09-17 | End: 2017-09-17

## 2017-09-17 RX ORDER — SODIUM CHLORIDE AND POTASSIUM CHLORIDE 150; 900 MG/100ML; MG/100ML
INJECTION, SOLUTION INTRAVENOUS CONTINUOUS
Status: DISCONTINUED | OUTPATIENT
Start: 2017-09-17 | End: 2017-09-18

## 2017-09-17 RX ORDER — SODIUM CHLORIDE AND POTASSIUM CHLORIDE 150; 450 MG/100ML; MG/100ML
INJECTION, SOLUTION INTRAVENOUS CONTINUOUS
Status: DISCONTINUED | OUTPATIENT
Start: 2017-09-17 | End: 2017-09-17

## 2017-09-17 RX ORDER — SODIUM CHLORIDE AND POTASSIUM CHLORIDE 300; 900 MG/100ML; MG/100ML
INJECTION, SOLUTION INTRAVENOUS CONTINUOUS
Status: DISCONTINUED | OUTPATIENT
Start: 2017-09-17 | End: 2017-09-17

## 2017-09-17 RX ADMIN — HYDROMORPHONE HYDROCHLORIDE 2 MG: 2 TABLET ORAL at 20:42

## 2017-09-17 RX ADMIN — POTASSIUM CHLORIDE 10 MEQ: 7.46 INJECTION, SOLUTION INTRAVENOUS at 05:44

## 2017-09-17 RX ADMIN — OMEPRAZOLE 20 MG: 20 CAPSULE, DELAYED RELEASE ORAL at 09:32

## 2017-09-17 RX ADMIN — DOXYCYCLINE 100 MG: 100 INJECTION, POWDER, LYOPHILIZED, FOR SOLUTION INTRAVENOUS at 21:30

## 2017-09-17 RX ADMIN — HYDROMORPHONE HYDROCHLORIDE 0.5 MG: 1 INJECTION, SOLUTION INTRAMUSCULAR; INTRAVENOUS; SUBCUTANEOUS at 09:32

## 2017-09-17 RX ADMIN — ONDANSETRON 4 MG: 2 INJECTION INTRAMUSCULAR; INTRAVENOUS at 13:23

## 2017-09-17 RX ADMIN — SERTRALINE 50 MG: 50 TABLET, FILM COATED ORAL at 20:43

## 2017-09-17 RX ADMIN — DOXYCYCLINE 100 MG: 100 INJECTION, POWDER, LYOPHILIZED, FOR SOLUTION INTRAVENOUS at 09:48

## 2017-09-17 RX ADMIN — CEFTRIAXONE 2 G: 2 INJECTION, POWDER, FOR SOLUTION INTRAMUSCULAR; INTRAVENOUS at 20:43

## 2017-09-17 RX ADMIN — HYDROMORPHONE HYDROCHLORIDE 0.5 MG: 1 INJECTION, SOLUTION INTRAMUSCULAR; INTRAVENOUS; SUBCUTANEOUS at 16:47

## 2017-09-17 RX ADMIN — ONDANSETRON 4 MG: 2 INJECTION INTRAMUSCULAR; INTRAVENOUS at 02:31

## 2017-09-17 RX ADMIN — ONDANSETRON 4 MG: 2 INJECTION INTRAMUSCULAR; INTRAVENOUS at 20:49

## 2017-09-17 RX ADMIN — HYDROMORPHONE HYDROCHLORIDE 0.5 MG: 1 INJECTION, SOLUTION INTRAMUSCULAR; INTRAVENOUS; SUBCUTANEOUS at 05:43

## 2017-09-17 RX ADMIN — HYDROMORPHONE HYDROCHLORIDE 0.5 MG: 1 INJECTION, SOLUTION INTRAMUSCULAR; INTRAVENOUS; SUBCUTANEOUS at 13:23

## 2017-09-17 RX ADMIN — HYDROMORPHONE HYDROCHLORIDE 0.5 MG: 1 INJECTION, SOLUTION INTRAMUSCULAR; INTRAVENOUS; SUBCUTANEOUS at 02:26

## 2017-09-17 RX ADMIN — POTASSIUM CHLORIDE 10 MEQ: 7.46 INJECTION, SOLUTION INTRAVENOUS at 07:34

## 2017-09-17 RX ADMIN — POTASSIUM CHLORIDE 10 MEQ: 7.46 INJECTION, SOLUTION INTRAVENOUS at 07:00

## 2017-09-17 RX ADMIN — POTASSIUM CHLORIDE AND SODIUM CHLORIDE: 900; 150 INJECTION, SOLUTION INTRAVENOUS at 13:23

## 2017-09-17 RX ADMIN — SODIUM CHLORIDE: 9 INJECTION, SOLUTION INTRAVENOUS at 05:43

## 2017-09-17 ASSESSMENT — PAIN SCALES - GENERAL
PAINLEVEL_OUTOF10: 5
PAINLEVEL_OUTOF10: 6
PAINLEVEL_OUTOF10: 5
PAINLEVEL_OUTOF10: 5
PAINLEVEL_OUTOF10: 7

## 2017-09-17 ASSESSMENT — ENCOUNTER SYMPTOMS
NERVOUS/ANXIOUS: 1
COUGH: 0
WEAKNESS: 0
VOMITING: 0
EYE PAIN: 0
ABDOMINAL PAIN: 1
HEADACHES: 0
DIARRHEA: 0
BLURRED VISION: 0
CLAUDICATION: 0
WHEEZING: 0
MYALGIAS: 0
FEVER: 0
INSOMNIA: 0
SPUTUM PRODUCTION: 0
NECK PAIN: 0
CHILLS: 0
SEIZURES: 0
PHOTOPHOBIA: 0
DOUBLE VISION: 0
STRIDOR: 0
BLOOD IN STOOL: 0
SORE THROAT: 0
CONSTIPATION: 0
HEARTBURN: 0
HEMOPTYSIS: 0
PALPITATIONS: 0
DIZZINESS: 0
HALLUCINATIONS: 0
BACK PAIN: 0
ORTHOPNEA: 0
SHORTNESS OF BREATH: 0
FALLS: 0
NAUSEA: 1
LOSS OF CONSCIOUSNESS: 0
TINGLING: 0
EYE DISCHARGE: 0
FLANK PAIN: 0
TREMORS: 0

## 2017-09-17 NOTE — NON-PROVIDER
Internal Medicine Medical Student Note    Name Rikki Toscano     1985   Age/Sex 32 y.o. female   MRN [id number]   Code Status FULL     After 5PM or if no immediate response to page, please call for cross-coverage  Attending/Team: Norma/Green See Patient List for primary contact information  Call [phone] to page after hours   1st Call - Day Intern (R1):   Lila 2nd Call - Day Sr. Resident (R2/R3):   Mutasher     Reason for interval visit  (Principal Problem)   Intractable vomiting with nausea    Interval Problem Daily Status Update  (24 hours)   The pt has not had any episodes of vomiting over the past 24h. Clinically appears much better compared to yesterday. She has had some nausea which has been controlled w/ IV Phenergan and Zofran. Pt continues to have diffuse abdominal pain, but states that medication regimen helps well w/ pain control. Pt has remained NPO but has tolerated water and ice well. Blood and urine cx continue to show NGTD, but may be 2/2 sterile cx s/p abx. Will continue empiric abx treating potential PID and pyelonephritis.    Review of Systems   Constitutional: Positive for malaise/fatigue. Negative for chills and fever.   HENT: Negative for congestion and sore throat.    Eyes: Negative for blurred vision, double vision, photophobia and discharge.   Respiratory: Negative for cough, hemoptysis, sputum production, shortness of breath and wheezing.    Cardiovascular: Negative for chest pain, palpitations, orthopnea, claudication and leg swelling.   Gastrointestinal: Positive for abdominal pain and nausea. Negative for blood in stool, constipation, diarrhea, heartburn and vomiting.   Genitourinary: Negative for dysuria, flank pain, frequency, hematuria and urgency.   Musculoskeletal: Negative for back pain, joint pain, myalgias and neck pain.   Neurological: Negative for dizziness, tingling, tremors, weakness and headaches.   Psychiatric/Behavioral: Negative for  hallucinations. The patient is nervous/anxious. The patient does not have insomnia.      Physical Exam     Vitals:    09/16/17 1500 09/16/17 2000 09/17/17 0340 09/17/17 0800   BP: 103/68 136/78 125/73 128/78   Pulse: 61 (!) 58 82 (!) 55   Resp: 18 18 18 18   Temp: 36.2 °C (97.1 °F) 36.7 °C (98 °F) 36.9 °C (98.4 °F) 36.4 °C (97.6 °F)   SpO2: 99% 97% 97% 97%   Weight:       Height:         Body mass index is 30.42 kg/m².    Oxygen Therapy:  Pulse Oximetry: 97 %, O2 (LPM): 0, O2 Delivery: None (Room Air)    Physical Exam  Constitutional: Well developed woman, appears stated age, sitting comfortably in hospital bed  HEENT: NC/AT, PERRL, EOMI, MMM, no lesions or exudates noted in oral cavity  CV: RRR, no m/r/g  Resp: Normal WOB, CTAB  Abd: Soft, non-distended, non-tender, hyperactive BS; TTP diffusely across abdomen; no masses, no organomegaly, no rebound, no guarding  Ext: 2+ pulses bilaterally, no clubbing/cyanosis/edema; FROM of BLE/BUE  Neuro: A&O x 3, CN 2-12 grossly intact, 5/5 strength in BLE/BUE, normal sensation    Assessment/Plan     #Intractable Nausea/Vomiting  #Abdominal Pain  - Ddx includes pyelonephritis, pelvic inflammatory disease and gastritis  - CT abdomen shows 2.3 x 2.5 x 3.9 cm left adnexal cystic lesions and 1.7 cm lesion in upper pole of the R kidney  - Pelvic ultrasound is suspicious for hydrosalpinx, MRI kidney suspicious for pyelonephritis vs scarring  - Pt has been afebrile w/ normal VS; however, WBC is borderline elevated, 10.2k today  - Blood and urine cx have shown NGTD, but pt started abx prior to cx draws  - Urine GC/CT negative  - Concern pt may have had MW tears as she had hematemesis yesterday morning  PLAN:  - Continue doxycycline x 14 days, started on 9/15 for tx of GC/CT in PID  - Continue C3 for tx of possible pyelonephritis  - Continue to follow blood and urine cx  - IV zofran, IV phenergan for intractable nausea/vomiting  - Dilaudid 0.5 mg q3h prn for abdominal pain  - Continue  to maintain NPO w/ water   - Continue IVF while pt is NPO    #Hypokalemia  - K is 3.0 today, down from 3.8; pt has had multiple bouts of hypokalemia during admission, 3.2 on day of admission  - Likely 2/2 vomiting and GI losses  - Received KCl 30 mEq IV today  PLAN:  - Repeat BMP tomorrow

## 2017-09-17 NOTE — CARE PLAN
Problem: Safety  Goal: Will remain free from injury  Outcome: PROGRESSING AS EXPECTED  Bed on lowest position, call light within reach, patient educated about use of call light and safety precautions.     Problem: Pain Management  Goal: Pain level will decrease to patient's comfort goal  Outcome: PROGRESSING AS EXPECTED  Pain medication around the clock.     Problem: Psychosocial Needs:  Goal: Level of anxiety will decrease  Outcome: PROGRESSING AS EXPECTED  Pt voicing out feelings.

## 2017-09-17 NOTE — PROGRESS NOTES
Isabelle Howe Fall Risk Assessment:     Last Known Fall: No falls  Mobility: No limitations  Medications: Cardiovascular or central nervous system meds  Mental Status/LOC/Awareness: Awake, alert, and oriented to date, place, and person  Toileting Needs: No needs  Volume/Electrolyte Status: Use of IV fluids/tube feeds  Communication/Sensory: Visual (Glasses)/hearing deficit  Behavior: Appropriate behavior, Depression/anxiety  Isabelle Howe Fall Risk Total: 6  Fall Risk Level: NO RISK    Universal Fall Precautions:  call light/belongings in reach, bed in low position and locked, siderails up x 2, adequate lighting, clutter free and spill free environment, educate to call for assistance    Fall Risk Level Interventions:          Patient Specific Interventions:     Medication: review medications with patient and family  Mental Status/LOC/Awareness: reinforce falls education and check on patient hourly  Toileting: instruct patient/family on the use of grab bars and instruct patient/family on the need to call for assistance when toileting  Volume/Electrolyte Status: ensure patient remains hydrated, administer medications as ordered for nausea and vomiting, monitor abnormal lab values and ensure IV fluids are appropriate  Communication/Sensory: update plan of care on whiteboard and ensure proper positioning when transferrng/ambulating  Behavioral: encourage patient to voice feelings  Mobility: dangle prior to standing and ensure bed is locked and in lowest position

## 2017-09-17 NOTE — PROGRESS NOTES
Pt axo x 4. abd pain controlled with dilaudid IV, nausea with zofran. New IV placed in R hand, IV abx given. Remains NPO. Voiding in toilet, no BM overnight. Continue with plan of care.

## 2017-09-17 NOTE — PROGRESS NOTES
Internal Medicine Interval Note    Name Rikki Toscano     1985   Age/Sex 32 y.o. female   MRN [id number]   Code Status Full     After 5PM or if no immediate response to page, please call for cross-coverage  Attending/Team: Dr. Green/Madhav See Patient List for primary contact information  Call [phone] to page    1st Call - Day Intern (R1):   Dr. Sharp 2nd Call - Day Sr. Resident (R2/R3):   Dr. Rowland     Reason for interval visit  (Principal Problem)   Intractable vomiting with nausea    Interval Problem Daily Status Update  (24 hours)     2017   Patient pain is better controlled now, didn't have any vomiting, her odynophagia have improved. We'll start advancing her diet as tolerated. We'll continue to treat her as pyelonephritis, MRI imaging was not able to distinguish between the previous scar or an active pyelogram, the source for her pain could be her hydrosalpinx and possible pelvic inflammatory disease. Hypokalemia replacing IV 30 meq.     Review of Systems   Constitutional: Negative for chills and fever.   Eyes: Negative for pain and discharge.   Respiratory: Negative for cough, shortness of breath, wheezing and stridor.    Cardiovascular: Negative for chest pain, palpitations and leg swelling.   Gastrointestinal: Positive for abdominal pain (improved) and nausea. Negative for blood in stool, diarrhea, heartburn and vomiting.        Diffuse abdominal pain, worst in epigastric region as per patient    Genitourinary: Negative for dysuria, frequency and urgency.   Musculoskeletal: Negative for falls and myalgias.   Skin: Negative for rash.   Neurological: Negative for seizures and loss of consciousness.   Psychiatric/Behavioral: Negative for hallucinations.     Consultants/Specialty  Gyn    Disposition  Inpatient    Quality Measures    Reviewed items::  Medications reviewed, Labs reviewed and Radiology images reviewed  Padron catheter::  No Padron  DVT prophylaxis pharmacological::   Not indicated at this time, ambulatory  DVT prophylaxis - mechanical:  SCDs  Ulcer Prophylaxis::  Yes  Antibiotics:  Treating active infection/contamination beyond 24 hours perioperative coverage    Physical Exam       Vitals:    09/16/17 1500 09/16/17 2000 09/17/17 0340 09/17/17 0800   BP: 103/68 136/78 125/73 128/78   Pulse: 61 (!) 58 82 (!) 55   Resp: 18 18 18 18   Temp: 36.2 °C (97.1 °F) 36.7 °C (98 °F) 36.9 °C (98.4 °F) 36.4 °C (97.6 °F)   SpO2: 99% 97% 97% 97%   Weight:       Height:         Body mass index is 30.42 kg/m².    Oxygen Therapy:  Pulse Oximetry: 97 %, O2 (LPM): 0, O2 Delivery: None (Room Air)    Physical Exam   Constitutional: She is oriented to person, place, and time. No distress.   HENT:   Head: Normocephalic and atraumatic.   Eyes: Conjunctivae are normal.   Neck: No JVD present.   Cardiovascular: Normal rate, regular rhythm and normal heart sounds.    Pulmonary/Chest: Effort normal and breath sounds normal. No stridor. No respiratory distress. She has no wheezes.   Abdominal: Soft. She exhibits no distension. There is tenderness. There is no rebound and no guarding.   Diffuse abdominal pain, mostly upper and central, improved compared to yesterday   Musculoskeletal: Normal range of motion. She exhibits no edema or deformity.   Neurological: She is alert and oriented to person, place, and time.   Skin: No erythema.   Psychiatric: Affect and judgment normal.     Lab Data Review:     9/16/2017  9:27 AM    Recent Labs      09/15/17   0332  09/16/17   0346  09/17/17   0248   SODIUM  137  139  135   POTASSIUM  3.1*  3.8  3.0*   CHLORIDE  107  106  104   CO2  19*  23  22   BUN  5*  5*  5*   CREATININE  0.54  0.52  0.51   CALCIUM  8.4*  8.6  8.7       Recent Labs      09/15/17   0332  09/16/17   0346  09/17/17   0248   ALTSGPT  5  6  5   ASTSGOT  9*  8*  7*   ALKPHOSPHAT  53  49  48   TBILIRUBIN  0.4  0.3  0.4   GLUCOSE  86  86  81       Recent Labs      09/15/17   0332  09/16/17   0346  09/17/17    0248   RBC  4.32  4.16*  4.41   HEMOGLOBIN  12.3  12.3  12.7   HEMATOCRIT  36.6*  35.5*  38.1   PLATELETCT  316  282  277       Recent Labs      09/15/17   0332  09/16/17   0346  09/17/17   0248   WBC  12.8*  8.8  10.2   NEUTSPOLYS  65.80  55.90  56.60   LYMPHOCYTES  27.40  34.40  33.10   MONOCYTES  5.10  7.00  7.50   EOSINOPHILS  0.90  1.70  1.40   BASOPHILS  0.30  0.30  0.60   ASTSGOT  9*  8*  7*   ALTSGPT  5  6  5   ALKPHOSPHAT  53  49  48   TBILIRUBIN  0.4  0.3  0.4       Assessment/Plan     * Intractable vomiting with nausea and abdominal pain   Assessment & Plan    Presented with 6 day h/o intractable vomiting and nausea with diffuse abdominal pain, worst in LLQ. No h/o dysuria or diarrhea. No fevers. CT abdomen showed 2.3 x 2.5 x 3.9 cm left adnexal cystic with nonspecific free fluid in the pelvis and an ill-defined 1.7 cm hypodense lesion in the upper to midpole of the right kidney, which could represent focal pyelonephritis.UA neg leuk esterase and nitrite, +ketones. Pregnancy test negative. Procalcitonin neg. Bcx and UCx negx2.  Urine GC/CT neg. U/s showed hydrosalpinx.   - Supportive care with IV zofran & Reglan, pain control  - GI PPX with pepcid injections   - IP Gyn Consult placed - recommend electrolyte repletion, empiric treatment with doxycycline.   - Doxycycline started for 14 day course on 9/15. Will be administered IV for now. Ceftriaxone continued to cover for potential partially treated pylenephritis, as this cannot be ruled out.  - Dilaudid 0.2-0.5 mg IV q 3 prn and dilaudid 2 mg PO q 4 PRN added with hold parameters  - C/w IVF  - Restart doing clear liquids diets, advance as tolerated to full liquid then regular  - If pain acutely worsens, consider repeat pelvic u/s         Hypokalemia   Assessment & Plan    K 3.0 replacing IV  Switching NS and to NS with 20 mEq of potassium   CTM chem, replete as needed

## 2017-09-17 NOTE — PROGRESS NOTES
"Assumed care of patient at 0700 . Received report from RN. Patient is AOX4 . Assessment complete. Labs reviewed.Patient and RN discussed plan of care. Patient questions answered. Patient needs are met at this time. Bed in lowest and locked position. Upper bed rails up.  Call light is within reach. Hourly rounding in place.  /78   Pulse (!) 55   Temp 36.4 °C (97.6 °F)   Resp 18   Ht 1.626 m (5' 4\")   Wt 80.4 kg (177 lb 4 oz)   LMP 09/02/2017 (Exact Date)   SpO2 97%   Breastfeeding? No   BMI 30.42 kg/m²     "

## 2017-09-17 NOTE — PROGRESS NOTES
"Assumed care at 0700. Received report from RN. Patient is AOx4. Pain and nausea management in place. Assessment complete. Labs reviewed. Patient and RN discussed plan of care. Patient questions answered. Patient needs are met at this time. Bed in lowest and locked position. Call light is within reach. Hourly rounding in place. /99   Pulse (!) 53   Temp 36.2 °C (97.2 °F)   Resp 18   Ht 1.626 m (5' 4\")   Wt 80.4 kg (177 lb 4 oz)   LMP 09/02/2017 (Exact Date)   SpO2 98%   Breastfeeding? No   BMI 30.42 kg/m²       "

## 2017-09-17 NOTE — CARE PLAN
Isabelle Howe Fall Risk Assessment:     Last Known Fall: No falls  Mobility: No limitations  Medications: No meds  Mental Status/LOC/Awareness: Awake, alert, and oriented to date, place, and person  Toileting Needs: No needs  Volume/Electrolyte Status: Use of IV fluids/tube feeds  Communication/Sensory: Visual (Glasses)/hearing deficit  Behavior: Depression/anxiety  Isabelle Howe Fall Risk Total: 5  Fall Risk Level: NO RISK    Universal Fall Precautions:  call light/belongings in reach, bed in low position and locked, siderails up x 2, use non-slip footwear, adequate lighting, clutter free and spill free environment, educate to call for assistance    Fall Risk Level Interventions:          Patient Specific Interventions:     Medication: review medications with patient and family  Mental Status/LOC/Awareness: reinforce falls education, check on patient hourly and reinforce the use of call light  Toileting: instruct patient/family on the need to call for assistance when toileting  Volume/Electrolyte Status: ensure patient remains hydrated, advance diet as tolerated, administer medications as ordered for nausea and vomiting, monitor abnormal lab values and ensure IV fluids are appropriate  Communication/Sensory: update plan of care on whiteboard and ensure proper positioning when transferrng/ambulating  Behavioral: encourage patient to voice feelings and administer medication as ordered  Mobility: provide comfort measures during transport and ensure bed is locked and in lowest position

## 2017-09-17 NOTE — CARE PLAN
Problem: Communication  Goal: The ability to communicate needs accurately and effectively will improve  Outcome: PROGRESSING AS EXPECTED  Pt understands use of call light for need of assistance.     Problem: Safety  Goal: Will remain free from injury  Outcome: PROGRESSING AS EXPECTED  Bed on lowest position, call light within reach, patient educated about use of call light and safety precautions.     Problem: Pain Management  Goal: Pain level will decrease to patient's comfort goal  Outcome: PROGRESSING AS EXPECTED  Pain medications PRN. Quiet area and rest for comfort.

## 2017-09-17 NOTE — CARE PLAN
Problem: Safety  Goal: Will remain free from injury  Outcome: PROGRESSING AS EXPECTED  Patient has bed locked in lowest position, call light within reach, tread socks on, patient demonstrates using call light appropriately.      Problem: Infection  Goal: Will remain free from infection  Outcome: PROGRESSING AS EXPECTED  Pt educated on the s/s of infection, verbalizes understanding, will continue to monitor

## 2017-09-17 NOTE — PROGRESS NOTES
"Assumed care of patient at 0700 . Received report from RN. Patient is AOX4. Assessment complete. Labs reviewed.Patient and RN discussed plan of care. Patient questions answered. Patient needs are met at this time. Bed in lowest and locked position. Upper bed rails up.  Call light is within reach. Hourly rounding in place.  /68   Pulse 61   Temp 36.2 °C (97.1 °F)   Resp 18   Ht 1.626 m (5' 4\")   Wt 80.4 kg (177 lb 4 oz)   LMP 09/02/2017 (Exact Date)   SpO2 99%   Breastfeeding? No   BMI 30.42 kg/m²     "

## 2017-09-18 ENCOUNTER — APPOINTMENT (OUTPATIENT)
Dept: RADIOLOGY | Facility: MEDICAL CENTER | Age: 32
DRG: 689 | End: 2017-09-18
Attending: STUDENT IN AN ORGANIZED HEALTH CARE EDUCATION/TRAINING PROGRAM
Payer: MEDICAID

## 2017-09-18 LAB
ANION GAP SERPL CALC-SCNC: 11 MMOL/L (ref 0–11.9)
BASOPHILS # BLD AUTO: 0.5 % (ref 0–1.8)
BASOPHILS # BLD: 0.05 K/UL (ref 0–0.12)
BUN SERPL-MCNC: 5 MG/DL (ref 8–22)
CALCIUM SERPL-MCNC: 8.7 MG/DL (ref 8.5–10.5)
CHLORIDE SERPL-SCNC: 102 MMOL/L (ref 96–112)
CO2 SERPL-SCNC: 22 MMOL/L (ref 20–33)
CREAT SERPL-MCNC: 0.56 MG/DL (ref 0.5–1.4)
EOSINOPHIL # BLD AUTO: 0.15 K/UL (ref 0–0.51)
EOSINOPHIL NFR BLD: 1.6 % (ref 0–6.9)
ERYTHROCYTE [DISTWIDTH] IN BLOOD BY AUTOMATED COUNT: 38.9 FL (ref 35.9–50)
GFR SERPL CREATININE-BSD FRML MDRD: >60 ML/MIN/1.73 M 2
GLUCOSE SERPL-MCNC: 79 MG/DL (ref 65–99)
HCT VFR BLD AUTO: 38.4 % (ref 37–47)
HGB BLD-MCNC: 13.2 G/DL (ref 12–16)
IMM GRANULOCYTES # BLD AUTO: 0.02 K/UL (ref 0–0.11)
IMM GRANULOCYTES NFR BLD AUTO: 0.2 % (ref 0–0.9)
LYMPHOCYTES # BLD AUTO: 3.34 K/UL (ref 1–4.8)
LYMPHOCYTES NFR BLD: 36.4 % (ref 22–41)
MCH RBC QN AUTO: 28.6 PG (ref 27–33)
MCHC RBC AUTO-ENTMCNC: 34.4 G/DL (ref 33.6–35)
MCV RBC AUTO: 83.1 FL (ref 81.4–97.8)
MONOCYTES # BLD AUTO: 0.77 K/UL (ref 0–0.85)
MONOCYTES NFR BLD AUTO: 8.4 % (ref 0–13.4)
NEUTROPHILS # BLD AUTO: 4.85 K/UL (ref 2–7.15)
NEUTROPHILS NFR BLD: 52.9 % (ref 44–72)
NRBC # BLD AUTO: 0 K/UL
NRBC BLD AUTO-RTO: 0 /100 WBC
PLATELET # BLD AUTO: 322 K/UL (ref 164–446)
PMV BLD AUTO: 9.6 FL (ref 9–12.9)
POTASSIUM SERPL-SCNC: 3.5 MMOL/L (ref 3.6–5.5)
RBC # BLD AUTO: 4.62 M/UL (ref 4.2–5.4)
SODIUM SERPL-SCNC: 135 MMOL/L (ref 135–145)
WBC # BLD AUTO: 9.2 K/UL (ref 4.8–10.8)

## 2017-09-18 PROCEDURE — 700111 HCHG RX REV CODE 636 W/ 250 OVERRIDE (IP): Performed by: STUDENT IN AN ORGANIZED HEALTH CARE EDUCATION/TRAINING PROGRAM

## 2017-09-18 PROCEDURE — 700102 HCHG RX REV CODE 250 W/ 637 OVERRIDE(OP): Performed by: STUDENT IN AN ORGANIZED HEALTH CARE EDUCATION/TRAINING PROGRAM

## 2017-09-18 PROCEDURE — 80048 BASIC METABOLIC PNL TOTAL CA: CPT

## 2017-09-18 PROCEDURE — A9270 NON-COVERED ITEM OR SERVICE: HCPCS | Performed by: STUDENT IN AN ORGANIZED HEALTH CARE EDUCATION/TRAINING PROGRAM

## 2017-09-18 PROCEDURE — 700101 HCHG RX REV CODE 250: Performed by: STUDENT IN AN ORGANIZED HEALTH CARE EDUCATION/TRAINING PROGRAM

## 2017-09-18 PROCEDURE — 36415 COLL VENOUS BLD VENIPUNCTURE: CPT

## 2017-09-18 PROCEDURE — 99232 SBSQ HOSP IP/OBS MODERATE 35: CPT | Mod: GC | Performed by: HOSPITALIST

## 2017-09-18 PROCEDURE — 76775 US EXAM ABDO BACK WALL LIM: CPT

## 2017-09-18 PROCEDURE — 770006 HCHG ROOM/CARE - MED/SURG/GYN SEMI*

## 2017-09-18 PROCEDURE — 700105 HCHG RX REV CODE 258: Performed by: STUDENT IN AN ORGANIZED HEALTH CARE EDUCATION/TRAINING PROGRAM

## 2017-09-18 PROCEDURE — 85025 COMPLETE CBC W/AUTO DIFF WBC: CPT

## 2017-09-18 PROCEDURE — 700101 HCHG RX REV CODE 250: Performed by: HOSPITALIST

## 2017-09-18 RX ORDER — POTASSIUM CHLORIDE 20 MEQ/1
40 TABLET, EXTENDED RELEASE ORAL DAILY
Status: DISCONTINUED | OUTPATIENT
Start: 2017-09-18 | End: 2017-09-19 | Stop reason: HOSPADM

## 2017-09-18 RX ORDER — METOCLOPRAMIDE 10 MG/1
10 TABLET ORAL EVERY 6 HOURS PRN
Status: DISCONTINUED | OUTPATIENT
Start: 2017-09-18 | End: 2017-09-19 | Stop reason: HOSPADM

## 2017-09-18 RX ADMIN — DOXYCYCLINE 100 MG: 100 INJECTION, POWDER, LYOPHILIZED, FOR SOLUTION INTRAVENOUS at 21:00

## 2017-09-18 RX ADMIN — SERTRALINE 50 MG: 50 TABLET, FILM COATED ORAL at 20:13

## 2017-09-18 RX ADMIN — HYDROMORPHONE HYDROCHLORIDE 2 MG: 2 TABLET ORAL at 01:06

## 2017-09-18 RX ADMIN — HYDROMORPHONE HYDROCHLORIDE 2 MG: 2 TABLET ORAL at 17:00

## 2017-09-18 RX ADMIN — ONDANSETRON 4 MG: 2 INJECTION INTRAMUSCULAR; INTRAVENOUS at 01:06

## 2017-09-18 RX ADMIN — HYDROMORPHONE HYDROCHLORIDE 2 MG: 2 TABLET ORAL at 13:01

## 2017-09-18 RX ADMIN — POTASSIUM CHLORIDE AND SODIUM CHLORIDE: 900; 150 INJECTION, SOLUTION INTRAVENOUS at 06:15

## 2017-09-18 RX ADMIN — CEFTRIAXONE 2 G: 2 INJECTION, POWDER, FOR SOLUTION INTRAMUSCULAR; INTRAVENOUS at 20:13

## 2017-09-18 RX ADMIN — ONDANSETRON 4 MG: 2 INJECTION INTRAMUSCULAR; INTRAVENOUS at 09:15

## 2017-09-18 RX ADMIN — OMEPRAZOLE 20 MG: 20 CAPSULE, DELAYED RELEASE ORAL at 08:43

## 2017-09-18 RX ADMIN — HYDROMORPHONE HYDROCHLORIDE 2 MG: 2 TABLET ORAL at 21:00

## 2017-09-18 RX ADMIN — ONDANSETRON 4 MG: 2 INJECTION INTRAMUSCULAR; INTRAVENOUS at 13:01

## 2017-09-18 RX ADMIN — ONDANSETRON 4 MG: 2 INJECTION INTRAMUSCULAR; INTRAVENOUS at 21:00

## 2017-09-18 RX ADMIN — POTASSIUM CHLORIDE 40 MEQ: 1500 TABLET, EXTENDED RELEASE ORAL at 08:43

## 2017-09-18 RX ADMIN — ONDANSETRON 4 MG: 2 INJECTION INTRAMUSCULAR; INTRAVENOUS at 05:17

## 2017-09-18 RX ADMIN — HYDROMORPHONE HYDROCHLORIDE 2 MG: 2 TABLET ORAL at 05:17

## 2017-09-18 RX ADMIN — DOXYCYCLINE 100 MG: 100 INJECTION, POWDER, LYOPHILIZED, FOR SOLUTION INTRAVENOUS at 09:38

## 2017-09-18 RX ADMIN — ONDANSETRON 4 MG: 2 INJECTION INTRAMUSCULAR; INTRAVENOUS at 17:00

## 2017-09-18 RX ADMIN — HYDROMORPHONE HYDROCHLORIDE 2 MG: 2 TABLET ORAL at 09:15

## 2017-09-18 ASSESSMENT — PAIN SCALES - GENERAL
PAINLEVEL_OUTOF10: 8
PAINLEVEL_OUTOF10: 8
PAINLEVEL_OUTOF10: 6
PAINLEVEL_OUTOF10: 7
PAINLEVEL_OUTOF10: 7
PAINLEVEL_OUTOF10: 5

## 2017-09-18 NOTE — PROGRESS NOTES
Assumed pt care at 0715.  Received report from analia RN.  Assessment completed.  Pt AAOx4.  Pt complains of pain.  Will medicate per MAR.  No other s/s of discomfort or distress. Pt ambulates up self.  Bed in lowest position and locked.  IV abx infusing.  Pt calls appropriately.  Treaded socks in place, call light within reach and staff numbers provided.  Pt needs met at this time.

## 2017-09-18 NOTE — CARE PLAN
Problem: Safety  Goal: Will remain free from injury    Intervention: Provide assistance with mobility  Call light within reach, treaded socks in place, bed in lowest position and locked.  Hourly rounding in progress.       Problem: Pain Management  Goal: Pain level will decrease to patient's comfort goal    Intervention: Follow pain managment plan developed in collaboration with patient and Interdisciplinary Team  Pt medicated PRN per MAR.

## 2017-09-19 VITALS
RESPIRATION RATE: 16 BRPM | HEIGHT: 64 IN | HEART RATE: 50 BPM | SYSTOLIC BLOOD PRESSURE: 118 MMHG | WEIGHT: 177.25 LBS | TEMPERATURE: 98.2 F | BODY MASS INDEX: 30.26 KG/M2 | DIASTOLIC BLOOD PRESSURE: 82 MMHG | OXYGEN SATURATION: 94 %

## 2017-09-19 PROBLEM — E87.6 HYPOKALEMIA: Status: RESOLVED | Noted: 2017-09-14 | Resolved: 2017-09-19

## 2017-09-19 LAB
ALBUMIN SERPL BCP-MCNC: 3.9 G/DL (ref 3.2–4.9)
ALBUMIN/GLOB SERPL: 1.3 G/DL
ALP SERPL-CCNC: 49 U/L (ref 30–99)
ALT SERPL-CCNC: 7 U/L (ref 2–50)
ANION GAP SERPL CALC-SCNC: 10 MMOL/L (ref 0–11.9)
AST SERPL-CCNC: 8 U/L (ref 12–45)
BASOPHILS # BLD AUTO: 0.5 % (ref 0–1.8)
BASOPHILS # BLD: 0.05 K/UL (ref 0–0.12)
BILIRUB SERPL-MCNC: 0.3 MG/DL (ref 0.1–1.5)
BUN SERPL-MCNC: 9 MG/DL (ref 8–22)
CALCIUM SERPL-MCNC: 9.4 MG/DL (ref 8.5–10.5)
CHLORIDE SERPL-SCNC: 101 MMOL/L (ref 96–112)
CO2 SERPL-SCNC: 25 MMOL/L (ref 20–33)
CREAT SERPL-MCNC: 0.6 MG/DL (ref 0.5–1.4)
EOSINOPHIL # BLD AUTO: 0.21 K/UL (ref 0–0.51)
EOSINOPHIL NFR BLD: 2 % (ref 0–6.9)
ERYTHROCYTE [DISTWIDTH] IN BLOOD BY AUTOMATED COUNT: 38.8 FL (ref 35.9–50)
GFR SERPL CREATININE-BSD FRML MDRD: >60 ML/MIN/1.73 M 2
GLOBULIN SER CALC-MCNC: 3.1 G/DL (ref 1.9–3.5)
GLUCOSE SERPL-MCNC: 96 MG/DL (ref 65–99)
HCT VFR BLD AUTO: 42.1 % (ref 37–47)
HGB BLD-MCNC: 14.3 G/DL (ref 12–16)
IMM GRANULOCYTES # BLD AUTO: 0.05 K/UL (ref 0–0.11)
IMM GRANULOCYTES NFR BLD AUTO: 0.5 % (ref 0–0.9)
LYMPHOCYTES # BLD AUTO: 3.39 K/UL (ref 1–4.8)
LYMPHOCYTES NFR BLD: 32.5 % (ref 22–41)
MCH RBC QN AUTO: 28.3 PG (ref 27–33)
MCHC RBC AUTO-ENTMCNC: 34 G/DL (ref 33.6–35)
MCV RBC AUTO: 83.2 FL (ref 81.4–97.8)
MONOCYTES # BLD AUTO: 0.74 K/UL (ref 0–0.85)
MONOCYTES NFR BLD AUTO: 7.1 % (ref 0–13.4)
NEUTROPHILS # BLD AUTO: 6 K/UL (ref 2–7.15)
NEUTROPHILS NFR BLD: 57.4 % (ref 44–72)
NRBC # BLD AUTO: 0 K/UL
NRBC BLD AUTO-RTO: 0 /100 WBC
PLATELET # BLD AUTO: 319 K/UL (ref 164–446)
PMV BLD AUTO: 9.5 FL (ref 9–12.9)
POTASSIUM SERPL-SCNC: 3.7 MMOL/L (ref 3.6–5.5)
PROT SERPL-MCNC: 7 G/DL (ref 6–8.2)
RBC # BLD AUTO: 5.06 M/UL (ref 4.2–5.4)
SODIUM SERPL-SCNC: 136 MMOL/L (ref 135–145)
WBC # BLD AUTO: 10.4 K/UL (ref 4.8–10.8)

## 2017-09-19 PROCEDURE — 99238 HOSP IP/OBS DSCHRG MGMT 30/<: CPT | Mod: GC | Performed by: HOSPITALIST

## 2017-09-19 PROCEDURE — A9270 NON-COVERED ITEM OR SERVICE: HCPCS | Performed by: STUDENT IN AN ORGANIZED HEALTH CARE EDUCATION/TRAINING PROGRAM

## 2017-09-19 PROCEDURE — 700102 HCHG RX REV CODE 250 W/ 637 OVERRIDE(OP): Performed by: STUDENT IN AN ORGANIZED HEALTH CARE EDUCATION/TRAINING PROGRAM

## 2017-09-19 PROCEDURE — 700101 HCHG RX REV CODE 250: Performed by: STUDENT IN AN ORGANIZED HEALTH CARE EDUCATION/TRAINING PROGRAM

## 2017-09-19 PROCEDURE — 700111 HCHG RX REV CODE 636 W/ 250 OVERRIDE (IP): Performed by: HOSPITALIST

## 2017-09-19 PROCEDURE — 85025 COMPLETE CBC W/AUTO DIFF WBC: CPT

## 2017-09-19 PROCEDURE — 700105 HCHG RX REV CODE 258: Performed by: STUDENT IN AN ORGANIZED HEALTH CARE EDUCATION/TRAINING PROGRAM

## 2017-09-19 PROCEDURE — 80053 COMPREHEN METABOLIC PANEL: CPT

## 2017-09-19 PROCEDURE — 36415 COLL VENOUS BLD VENIPUNCTURE: CPT

## 2017-09-19 PROCEDURE — 700111 HCHG RX REV CODE 636 W/ 250 OVERRIDE (IP): Performed by: STUDENT IN AN ORGANIZED HEALTH CARE EDUCATION/TRAINING PROGRAM

## 2017-09-19 RX ORDER — DOXYCYCLINE 100 MG/1
100 CAPSULE ORAL 2 TIMES DAILY
Qty: 18 CAP | Refills: 0 | Status: SHIPPED | OUTPATIENT
Start: 2017-09-19 | End: 2017-09-28

## 2017-09-19 RX ORDER — DOXYCYCLINE 100 MG/1
100 TABLET ORAL EVERY 12 HOURS
Status: DISCONTINUED | OUTPATIENT
Start: 2017-09-19 | End: 2017-09-19 | Stop reason: HOSPADM

## 2017-09-19 RX ADMIN — HYDROMORPHONE HYDROCHLORIDE 2 MG: 2 TABLET ORAL at 13:34

## 2017-09-19 RX ADMIN — ONDANSETRON 4 MG: 4 TABLET, ORALLY DISINTEGRATING ORAL at 09:08

## 2017-09-19 RX ADMIN — OMEPRAZOLE 20 MG: 20 CAPSULE, DELAYED RELEASE ORAL at 09:12

## 2017-09-19 RX ADMIN — ONDANSETRON 4 MG: 2 INJECTION INTRAMUSCULAR; INTRAVENOUS at 09:13

## 2017-09-19 RX ADMIN — DOXYCYCLINE 100 MG: 100 INJECTION, POWDER, LYOPHILIZED, FOR SOLUTION INTRAVENOUS at 09:12

## 2017-09-19 RX ADMIN — POTASSIUM CHLORIDE 40 MEQ: 1500 TABLET, EXTENDED RELEASE ORAL at 09:13

## 2017-09-19 RX ADMIN — HYDROMORPHONE HYDROCHLORIDE 2 MG: 2 TABLET ORAL at 02:39

## 2017-09-19 RX ADMIN — HYDROMORPHONE HYDROCHLORIDE 2 MG: 2 TABLET ORAL at 09:12

## 2017-09-19 ASSESSMENT — LIFESTYLE VARIABLES: EVER_SMOKED: YES

## 2017-09-19 ASSESSMENT — PAIN SCALES - GENERAL
PAINLEVEL_OUTOF10: 7
PAINLEVEL_OUTOF10: 6
PAINLEVEL_OUTOF10: 8

## 2017-09-19 NOTE — DISCHARGE SUMMARY
Internal Medicine Discharge Summary      Admit Date:  9/14/2017       Discharge Date:  9/19/17    Service:   R Internal Medicine Green Team  Attending Physician(s):   Dr. Mendez  Senior Resident(s):   Dr. Rowland  Jose Roberto Resident(s):   Dr. Sharp      Primary Diagnosis:   Intractable vomiting with diarrhea and abdominal pain       Hospital Summary (Brief Narrative):       31 yo F with PMH PID and asthma presented to ED on 9/14/2017 with  6 day history of nausea/vomiting/inability to tolerate PO. As per pt, symptoms began 7 days prior to presentation. She stated that she went to [facility] on 9/9/2017 and was diagnosed with a UTI and kidney infection and received bactrim, zofran, and phenergan with some relief. On 9/10, her symptoms returned. She reported vomiting 15 times in the 24 hours leading to diagnosis, with some blood reported in emesis at home. She endorsed diffuse severe abdominal pain, worst in LUQ, RUQ, and epigastric areas. Denied any recent NSAID use. She has taken tylenol for pain but no other OTC. She also reported fevers/chills.    In the ED, ABD CT showed 2.3 x 2.5 x 3.9 cm left adnexal cystic lesion, and ill-defined 1.7 cm hypodense lesion in the upper to midpole of the right kidney - possible focal pyelo vs solid mass. Patient was started on empiric ceftriaxone and transvaginal US ordered. Labs showed mild hypokalemia, due to emesis. Gynecology consulted. US showed a tubular cystic structure adjacent to the left ovary, measuring 3.5 x 4.2 x 1.6 cm, corresponding to mass seen on CT. Likely hydrosalpinx. On call gynecology was consulted.    On 9/15, pt started on doxycycline as per recommendation of Dr. Edwards, gynecology. Pelvic exam revealed significant L adnexal tenderness. Pt clinically improved, although reported episodes of discomfort.  Doxy course to be continued through 9/28/2017.    On 9/16, she was vomiting some blood-tinged fluid after repeated bouts of vomiting, likely MW tear.  Pain and nausea meds adjusted, with clinical improvement. MRI of abd showed 1.7 cm of possible localized pyelo vs scarring vs ischemia vs less likely neoplasm. Empiric antibiotics for presumptive pyelo (urine cultures here negative, however was on abx when collected and no urine culture was sent at previous OSH at initial diagnosis) were continued. Doxy for presumptive PID was further continued.    Pt has been improving since. Clnically stable and tolerating PO intake.    Consultants:     Gynecology    Procedures:        None    Imaging/ Testing:      US-RENAL   Final Result      No sonographic evidence of renal mass. This was best seen on MRI and CT, consider either modality for long-term follow-up purposes. Neoplasm is considered unlikely with pyelonephritis or vasculitis most likely      Tubular left adnexal structure suspicious for hydrosalpinx with adjacent complex cyst      MR-ABDOMEN-WITH & W/O   Final Result      1.7 cm region of hypoenhancement in the right renal cortex mid to upper pole. This is a nonspecific finding, less likely to represent neoplasm however this possibility is not entirely excluded. More likely would be focal pyelonephritis versus scarring    related to prior infection or ischemia. Ultrasound follow-up is recommended.      US-GYN-PELVIS TRANSVAGINAL   Final Result         1. Tubular cystic structure adjacent to the left ovary is suspicious for hydrosalpinx.      CT-ABDOMEN-PELVIS WITH   Final Result      2.3 x 2.5 x 3.9 cm left adnexal cystic lesion for which further evaluation with pelvic ultrasound is recommended.      Small amount of nonspecific free fluid in the pelvis.      Ill-defined 1.7 cm hypodense lesion in the upper to midpole of the right kidney could represent focal pyelonephritis. Solid mass on excluded. Further evaluation with renal protocol MRI is recommended.      Evaluation of the colon limited by relative decompression. Mild wall thickening difficult to exclude.              Discharge Medications:         Medication Reconciliation: Completed       Medication List      START taking these medications      Instructions   doxycycline 100 MG capsule  Commonly known as:  MONODOX   Take 1 Cap by mouth 2 times a day for 9 days.  Dose:  100 mg        CONTINUE taking these medications      Instructions   albuterol 108 (90 Base) MCG/ACT Aers inhalation aerosol   Inhale 2 Puffs by mouth every 6 hours as needed for Shortness of Breath.  Dose:  2 Puff     ketorolac 10 MG Tabs  Commonly known as:  TORADOL   Take 10 mg by mouth every 8 hours as needed for Moderate Pain.  Dose:  10 mg     ondansetron 4 MG Tbdp  Commonly known as:  ZOFRAN ODT   Take 4 mg by mouth every 8 hours as needed for Nausea/Vomiting.  Dose:  4 mg     sertraline 50 MG Tabs  Commonly known as:  ZOLOFT   Take 50 mg by mouth every day.  Dose:  50 mg        STOP taking these medications    sulfamethoxazole-trimethoprim 800-160 MG tablet  Commonly known as:  BACTRIM DS            Can use .DISCHARGEMEDSLIST if going to another facility         Disposition:   Home    Diet:   As tolerated    Activity:   As tolerated    Instructions:        Please take doxycycline with water twice daily to complete 14 day course. Take through 9/28.     The patient was instructed to return to the ER in the event of worsening symptoms. I have counseled the patient on the importance of compliance and the patient has agreed to proceed with all medical recommendations and follow up plan indicated above.   The patient understands that all medications come with benefits and risks. Risks may include permanent injury or death and these risks can be minimized with close reassessment and monitoring.           Follow up appointment details :      Follow up with PMD    Pending Studies:        None    Time spent on discharge day patient visit, preparing discharge paperwork and arranging for patient follow up.    Summary of follow up issues:   Continue  ant    Discharge Time (Minutes) :    60        Condition on Discharge    ______________________________________________________________________    Interval history/exam for day of discharge:     Stable. No emesis. Tolerating PO    Vitals:    09/18/17 1600 09/18/17 1959 09/19/17 0437 09/19/17 0800   BP: 140/56 137/85 131/74 118/82   Pulse: (!) 57 65 (!) 51 (!) 50   Resp: 17 18 16 16   Temp: 36.3 °C (97.4 °F) 36.3 °C (97.3 °F) 36.6 °C (97.8 °F) 36.8 °C (98.2 °F)   SpO2: 98% 96% 96% 94%   Weight:       Height:         Weight/BMI: Body mass index is 30.42 kg/m².  Pulse Oximetry: 94 %, O2 (LPM): 0, O2 Delivery: None (Room Air)    General: NAD  CVS: RRR NO MRG  PULM: CTAB    Most Recent Labs:    Lab Results   Component Value Date/Time    WBC 10.4 09/19/2017 02:32 AM    RBC 5.06 09/19/2017 02:32 AM    HEMOGLOBIN 14.3 09/19/2017 02:32 AM    HEMATOCRIT 42.1 09/19/2017 02:32 AM    MCV 83.2 09/19/2017 02:32 AM    MCH 28.3 09/19/2017 02:32 AM    MCHC 34.0 09/19/2017 02:32 AM    MPV 9.5 09/19/2017 02:32 AM    NEUTSPOLYS 57.40 09/19/2017 02:32 AM    LYMPHOCYTES 32.50 09/19/2017 02:32 AM    MONOCYTES 7.10 09/19/2017 02:32 AM    EOSINOPHILS 2.00 09/19/2017 02:32 AM    BASOPHILS 0.50 09/19/2017 02:32 AM      Lab Results   Component Value Date/Time    SODIUM 136 09/19/2017 02:32 AM    POTASSIUM 3.7 09/19/2017 02:32 AM    CHLORIDE 101 09/19/2017 02:32 AM    CO2 25 09/19/2017 02:32 AM    GLUCOSE 96 09/19/2017 02:32 AM    BUN 9 09/19/2017 02:32 AM    CREATININE 0.60 09/19/2017 02:32 AM    CREATININE 0.6 05/24/2006 12:05 AM      Lab Results   Component Value Date/Time    ALTSGPT 7 09/19/2017 02:32 AM    ASTSGOT 8 (L) 09/19/2017 02:32 AM    ALKPHOSPHAT 49 09/19/2017 02:32 AM    TBILIRUBIN 0.3 09/19/2017 02:32 AM    LIPASE 7 (L) 09/14/2017 12:30 PM    ALBUMIN 3.9 09/19/2017 02:32 AM    GLOBULIN 3.1 09/19/2017 02:32 AM    INR 0.96 05/24/2006 12:05 AM     Lab Results   Component Value Date/Time    PROTHROMBTM 12.1 05/24/2006 12:05 AM    INR  0.96 05/24/2006 12:05 AM

## 2017-09-19 NOTE — PROGRESS NOTES
Isabelle Howe Fall Risk Assessment:     Last Known Fall: No falls  Mobility: No limitations  Medications: Cardiovascular or central nervous system meds  Mental Status/LOC/Awareness: Awake, alert, and oriented to date, place, and person  Toileting Needs: No needs  Volume/Electrolyte Status: Use of IV fluids/tube feeds  Communication/Sensory: No deficits  Behavior: Depression/anxiety  Isabelle Howe Fall Risk Total: 5  Fall Risk Level: NO RISK    Universal Fall Precautions:  call light/belongings in reach, bed in low position and locked, siderails up x 2, use non-slip footwear, adequate lighting, clutter free and spill free environment, educate on level of risk, educate to call for assistance    Fall Risk Level Interventions:          Patient Specific Interventions:     Medication: review medications with patient and family  Mental Status/LOC/Awareness: not applicable  Toileting: not applicable  Volume/Electrolyte Status: ensure patient remains hydrated, administer medications as ordered for nausea and vomiting and monitor abnormal lab values  Communication/Sensory: not applicable  Behavioral: encourage patient to voice feelings and administer medication as ordered  Mobility: not applicable

## 2017-09-19 NOTE — PROGRESS NOTES
Assumed patient care at 0700. Received report from night shift. Assessment completed. POC discussed with pt, verbalizes understanding. A&Ox4. Pt C/o 7/10 pain, and nausea, medicated per MAR. Pt is up self, steady gait. Bed alarm not indicated. Bed locked in lowest position, personal possessions and call light placed within reach. Pt denies any additional needs.

## 2017-09-19 NOTE — PROGRESS NOTES
Internal Medicine Interval Note    Name Rikki Toscano     1985   Age/Sex 32 y.o. female   MRN [id number]   Code Status Full     After 5PM or if no immediate response to page, please call for cross-coverage  Attending/Team: Dr. Mendez/Madhav See Patient List for primary contact information  Call [phone] to page    1st Call - Day Intern (R1):   Dr. Sharp 2nd Call - Day Sr. Resident (R2/R3):   Dr. Pinto         Reason for interval visit  (Principal Problem)   Intractable vomiting with nausea    Interval Problem Daily Status Update  (24 hours)   Pt seen and examined at bedside. No acute distress. No episodes of emesis overnight. Nausea persists but improved with meds. Abdominal pain improved.  MRI results discussed with patient. She states she does not feel safe to go home until further work up of kidney mass. Renal ultrasound performed today.       ROS  Constitutional: Negative for chills and fever.   Eyes: Negative for pain and discharge.   Respiratory: Negative for cough, shortness of breath, wheezing and stridor.    Cardiovascular: Negative for chest pain, palpitations and leg swelling.   Gastrointestinal: Positive for abdominal pain (improved) and nausea. Negative for blood in stool, diarrhea, heartburn and vomiting.        Diffuse abdominal pain  Genitourinary: Negative for dysuria, frequency and urgency.   Musculoskeletal: Negative for falls and myalgias.   Skin: Negative for rash.   Neurological: Negative for seizures and loss of consciousness.   Psychiatric/Behavioral: Negative for hallucinations.     Consultants/Specialty  Gyn     Disposition  Inpatient    Quality Measures  Quality-Core Measures  Reviewed items::  Medications reviewed, Labs reviewed and Radiology images reviewed  Padron catheter::  No Padron  DVT prophylaxis pharmacological::  Not indicated at this time, ambulatory  DVT prophylaxis - mechanical:  SCDs  Ulcer Prophylaxis::  Yes  Antibiotics:  Treating active  infection/contamination beyond 24 hours perioperative coverage         Physical Exam       Vitals:    09/17/17 1557 09/17/17 1940 09/18/17 0420 09/18/17 1600   BP: 136/99 154/89 142/76 140/56   Pulse: (!) 53 77 (!) 57 (!) 57   Resp: 18 16 16 17   Temp: 36.2 °C (97.2 °F) 36.1 °C (96.9 °F) 36.6 °C (97.9 °F) 36.3 °C (97.4 °F)   SpO2: 98% 92% 98% 98%   Weight:       Height:         Body mass index is 30.42 kg/m².    Oxygen Therapy:  Pulse Oximetry: 98 %, O2 (LPM): 0, O2 Delivery: None (Room Air)    Physical Exam  Constitutional: No acute distress. Appears tired.  HENT:   Head: Normocephalic and atraumatic.   Eyes: Conjunctivae are normal.   Neck: No JVD present.   Cardiovascular: Normal rate, regular rhythm and normal heart sounds.    Pulmonary/Chest: Effort normal and breath sounds normal. No stridor. No respiratory distress. She has no wheezes.   Abdominal: Soft. She exhibits no distension. No guarding. There is no rebound and no guarding.   Diffuse abdominal pain, mostly upper and central, improved compared to yesterday   Musculoskeletal: Normal range of motion. She exhibits no edema or deformity.   Neurological: She is alert and oriented to person, place, and time.   Skin: No erythema.   Psychiatric: Affect and judgment normal.        Lab Data Review:         9/18/2017  6:29 PM    Recent Labs      09/16/17 0346 09/17/17 0248  09/18/17   0152   SODIUM  139  135  135   POTASSIUM  3.8  3.0*  3.5*   CHLORIDE  106  104  102   CO2  23  22  22   BUN  5*  5*  5*   CREATININE  0.52  0.51  0.56   CALCIUM  8.6  8.7  8.7       Recent Labs      09/16/17 0346 09/17/17   0248  09/18/17   0152   ALTSGPT  6  5   --    ASTSGOT  8*  7*   --    ALKPHOSPHAT  49  48   --    TBILIRUBIN  0.3  0.4   --    GLUCOSE  86  81  79       Recent Labs      09/16/17 0346 09/17/17   0248  09/18/17   0152   RBC  4.16*  4.41  4.62   HEMOGLOBIN  12.3  12.7  13.2   HEMATOCRIT  35.5*  38.1  38.4   PLATELETCT  282  277  322       Recent Labs       09/16/17   0346  09/17/17   0248  09/18/17   0152   WBC  8.8  10.2  9.2   NEUTSPOLYS  55.90  56.60  52.90   LYMPHOCYTES  34.40  33.10  36.40   MONOCYTES  7.00  7.50  8.40   EOSINOPHILS  1.70  1.40  1.60   BASOPHILS  0.30  0.60  0.50   ASTSGOT  8*  7*   --    ALTSGPT  6  5   --    ALKPHOSPHAT  49  48   --    TBILIRUBIN  0.3  0.4   --            Assessment/Plan     * Intractable vomiting with nausea and abdominal pain   Assessment & Plan    Presented with 6 day h/o intractable vomiting and nausea with diffuse abdominal pain, worst in LLQ. No h/o dysuria or diarrhea. No fevers. CT abdomen showed 2.3 x 2.5 x 3.9 cm left adnexal cystic with nonspecific free fluid in the pelvis and an ill-defined 1.7 cm hypodense lesion in the upper to midpole of the right kidney, which could represent focal pyelonephritis.UA neg leuk esterase and nitrite, +ketones. Pregnancy test negative. Procalcitonin neg. Bcx and UCx negx2.  Urine GC/CT neg. Pelvic U/s showed hydrosalpinx. Renal u/s today reported neoplasm is considered unlikely with pyelonephritis or vasculitis most likely  - Supportive care with IV zofran & Reglan, pain control  - GI PPX with pepcid injections   - IP Gyn Consult placed - recommend electrolyte repletion, empiric treatment with doxycycline.   - Doxycycline started for 14 day course on 9/15. Will be administered IV for now. Ceftriaxone continued to cover for potential partially treated pylenephritis, as this cannot be ruled out.  - Dilaudid 0.2-0.5 mg IV q 3 prn and dilaudid 2 mg PO q 4 PRN added with hold parameters  - C/w IVF  - Restart doing clear liquids diets, advance as tolerated to full liquid then regular  - If pain acutely worsens, consider repeat pelvic u/s            Hypokalemia   Assessment & Plan    K improved to 3.5 today  Continue NS with 20 mEq of potassium   CTM chem, replete as needed

## 2017-09-19 NOTE — PROGRESS NOTES
Report received. Assumed care, assessment complete. Pt is A & O x 4.  Pt reports pain at 6/10. Fall precautions and appropriate signs in place.  RN extension number provided. Pt educated regarding fall precautions. Bed alarm not indicated. Pt denies any additional needs at this time. Call light within reach.

## 2017-09-19 NOTE — DISCHARGE INSTRUCTIONS
Discharge Instructions    Discharged to home by car with relative. Discharged via walking, hospital escort: Refused.  Special equipment needed: Not Applicable    Be sure to schedule a follow-up appointment with your primary care doctor or any specialists as instructed.     Discharge Plan:   Pneumococcal Vaccine Given - only chart on this line when given: Given (See MAR)  Influenza Vaccine Indication: Patient Refuses    I understand that a diet low in cholesterol, fat, and sodium is recommended for good health. Unless I have been given specific instructions below for another diet, I accept this instruction as my diet prescription.   Other diet: As tolerated: Low fat, low sodium     Special Instructions: None    · Is patient discharged on Warfarin / Coumadin?   No     · Is patient Post Blood Transfusion?  No    Depression / Suicide Risk    As you are discharged from this [facility] facility, it is important to learn how to keep safe from harming yourself.    Recognize the warning signs:  · Abrupt changes in personality, positive or negative- including increase in energy   · Giving away possessions  · Change in eating patterns- significant weight changes-  positive or negative  · Change in sleeping patterns- unable to sleep or sleeping all the time   · Unwillingness or inability to communicate  · Depression  · Unusual sadness, discouragement and loneliness  · Talk of wanting to die  · Neglect of personal appearance   · Rebelliousness- reckless behavior  · Withdrawal from people/activities they love  · Confusion- inability to concentrate     If you or a loved one observes any of these behaviors or has concerns about self-harm, here's what you can do:  · Talk about it- your feelings and reasons for harming yourself  · Remove any means that you might use to hurt yourself (examples: pills, rope, extension cords, firearm)  · Get professional help from the community (Mental Health, Substance Abuse, psychological  counseling)  · Do not be alone:Call your Safe Contact- someone whom you trust who will be there for you.  · Call your local CRISIS HOTLINE 302-4721 or 395-203-8801  · Call your local Children's Mobile Crisis Response Team [place] (182) 376-2949 or www.[employer]  · Call the toll free National Suicide Prevention Hotlines   · National Suicide Prevention Lifeline 872-608-YBIU (5219)  · "[employer] Hope Line Network 800-SUICIDE (925-3009)      Nausea and Vomiting  Nausea means you feel sick to your stomach. Throwing up (vomiting) is a reflex where stomach contents come out of your mouth.  HOME CARE   · Take medicine as told by your doctor.  · Do not force yourself to eat. However, you do need to drink fluids.  · If you feel like eating, eat a normal diet as told by your doctor.  ¨ Eat rice, wheat, potatoes, bread, lean meats, yogurt, fruits, and vegetables.  ¨ Avoid high-fat foods.  · Drink enough fluids to keep your pee (urine) clear or pale yellow.  · Ask your doctor how to replace body fluid losses (rehydrate). Signs of body fluid loss (dehydration) include:  ¨ Feeling very thirsty.  ¨ Dry lips and mouth.  ¨ Feeling dizzy.  ¨ Dark pee.  ¨ Peeing less than normal.  ¨ Feeling confused.  ¨ Fast breathing or heart rate.  GET HELP RIGHT AWAY IF:   · You have blood in your throw up.  · You have black or bloody poop (stool).  · You have a bad headache or stiff neck.  · You feel confused.  · You have bad belly (abdominal) pain.  · You have chest pain or trouble breathing.  · You do not pee at least once every 8 hours.  · You have cold, clammy skin.  · You keep throwing up after 24 to 48 hours.  · You have a fever.  MAKE SURE YOU:   · Understand these instructions.  · Will watch your condition.  · Will get help right away if you are not doing well or get worse.     This information is not intended to replace advice given to you by your health care provider. Make sure you discuss any questions you have with your  health care provider.     Document Released: 06/05/2009 Document Revised: 03/11/2013 Document Reviewed: 05/18/2012  [employer] Interactive Patient Education ©2016 [employer]      Abdominal Pain, Adult  Many things can cause belly (abdominal) pain. Most times, the belly pain is not dangerous. Many cases of belly pain can be watched and treated at home.  HOME CARE   · Do not take medicines that help you go poop (laxatives) unless told to by your doctor.  · Only take medicine as told by your doctor.  · Eat or drink as told by your doctor. Your doctor will tell you if you should be on a special diet.  GET HELP IF:  · You do not know what is causing your belly pain.  · You have belly pain while you are sick to your stomach (nauseous) or have runny poop (diarrhea).  · You have pain while you pee or poop.  · Your belly pain wakes you up at night.  · You have belly pain that gets worse or better when you eat.  · You have belly pain that gets worse when you eat fatty foods.  · You have a fever.  GET HELP RIGHT AWAY IF:   · The pain does not go away within 2 hours.  · You keep throwing up (vomiting).  · The pain changes and is only in the right or left part of the belly.  · You have bloody or tarry looking poop.  MAKE SURE YOU:   · Understand these instructions.  · Will watch your condition.  · Will get help right away if you are not doing well or get worse.     This information is not intended to replace advice given to you by your health care provider. Make sure you discuss any questions you have with your health care provider.     Document Released: 06/05/2009 Document Revised: 01/08/2016 Document Reviewed: 08/27/2014  [employer] Interactive Patient Education ©2016 [employer]

## 2017-09-20 LAB
BACTERIA BLD CULT: NORMAL
BACTERIA BLD CULT: NORMAL
SIGNIFICANT IND 70042: NORMAL
SIGNIFICANT IND 70042: NORMAL
SITE SITE: NORMAL
SITE SITE: NORMAL
SOURCE SOURCE: NORMAL
SOURCE SOURCE: NORMAL

## 2017-09-20 NOTE — PROGRESS NOTES
PT discharged home with mother. PIV DC'ed. Prescriptions sent to pharmacy. Discharge instructions given specifically involving diagnosis and follow up appointment (pt to call and schedule). Patient verbalized understanding, 2nd copy of AVS signed and placed in patient chart.

## 2017-10-31 LAB — EKG IMPRESSION: NORMAL

## 2017-12-14 ENCOUNTER — HOSPITAL ENCOUNTER (OUTPATIENT)
Facility: MEDICAL CENTER | Age: 32
End: 2017-12-15
Attending: EMERGENCY MEDICINE | Admitting: HOSPITALIST
Payer: MEDICAID

## 2017-12-14 ENCOUNTER — RESOLUTE PROFESSIONAL BILLING HOSPITAL PROF FEE (OUTPATIENT)
Dept: HOSPITALIST | Facility: MEDICAL CENTER | Age: 32
End: 2017-12-14
Payer: MEDICAID

## 2017-12-14 DIAGNOSIS — E86.0 DEHYDRATION: ICD-10-CM

## 2017-12-14 DIAGNOSIS — R11.15 INTRACTABLE CYCLICAL VOMITING WITH NAUSEA: ICD-10-CM

## 2017-12-14 LAB
ALBUMIN SERPL BCP-MCNC: 4.8 G/DL (ref 3.2–4.9)
ALBUMIN/GLOB SERPL: 1.4 G/DL
ALP SERPL-CCNC: 62 U/L (ref 30–99)
ALT SERPL-CCNC: 8 U/L (ref 2–50)
AMPHET UR QL SCN: NEGATIVE
ANION GAP SERPL CALC-SCNC: 19 MMOL/L (ref 0–11.9)
APPEARANCE UR: CLEAR
AST SERPL-CCNC: 14 U/L (ref 12–45)
BACTERIA #/AREA URNS HPF: ABNORMAL /HPF
BARBITURATES UR QL SCN: NEGATIVE
BASOPHILS # BLD AUTO: 0.1 % (ref 0–1.8)
BASOPHILS # BLD: 0.01 K/UL (ref 0–0.12)
BENZODIAZ UR QL SCN: NEGATIVE
BILIRUB SERPL-MCNC: 0.8 MG/DL (ref 0.1–1.5)
BILIRUB UR QL STRIP.AUTO: NEGATIVE
BUN SERPL-MCNC: 12 MG/DL (ref 8–22)
BZE UR QL SCN: NEGATIVE
CALCIUM SERPL-MCNC: 9.9 MG/DL (ref 8.5–10.5)
CANNABINOIDS UR QL SCN: POSITIVE
CHLORIDE SERPL-SCNC: 102 MMOL/L (ref 96–112)
CO2 SERPL-SCNC: 17 MMOL/L (ref 20–33)
COLOR UR: YELLOW
CREAT SERPL-MCNC: 0.67 MG/DL (ref 0.5–1.4)
CULTURE IF INDICATED INDCX: YES UA CULTURE
EOSINOPHIL # BLD AUTO: 0 K/UL (ref 0–0.51)
EOSINOPHIL NFR BLD: 0 % (ref 0–6.9)
EPI CELLS #/AREA URNS HPF: ABNORMAL /HPF
ERYTHROCYTE [DISTWIDTH] IN BLOOD BY AUTOMATED COUNT: 40.7 FL (ref 35.9–50)
GFR SERPL CREATININE-BSD FRML MDRD: >60 ML/MIN/1.73 M 2
GLOBULIN SER CALC-MCNC: 3.4 G/DL (ref 1.9–3.5)
GLUCOSE SERPL-MCNC: 127 MG/DL (ref 65–99)
GLUCOSE UR STRIP.AUTO-MCNC: NEGATIVE MG/DL
HCG SERPL QL: NEGATIVE
HCT VFR BLD AUTO: 45.3 % (ref 37–47)
HGB BLD-MCNC: 15.7 G/DL (ref 12–16)
HYALINE CASTS #/AREA URNS LPF: ABNORMAL /LPF
IMM GRANULOCYTES # BLD AUTO: 0.07 K/UL (ref 0–0.11)
IMM GRANULOCYTES NFR BLD AUTO: 0.5 % (ref 0–0.9)
KETONES UR STRIP.AUTO-MCNC: >=160 MG/DL
LACTATE BLD-SCNC: 1.4 MMOL/L (ref 0.5–2)
LACTATE BLD-SCNC: 4.1 MMOL/L (ref 0.5–2)
LEUKOCYTE ESTERASE UR QL STRIP.AUTO: ABNORMAL
LIPASE SERPL-CCNC: 7 U/L (ref 11–82)
LYMPHOCYTES # BLD AUTO: 0.7 K/UL (ref 1–4.8)
LYMPHOCYTES NFR BLD: 4.5 % (ref 22–41)
MCH RBC QN AUTO: 28.7 PG (ref 27–33)
MCHC RBC AUTO-ENTMCNC: 34.7 G/DL (ref 33.6–35)
MCV RBC AUTO: 82.8 FL (ref 81.4–97.8)
METHADONE UR QL SCN: NEGATIVE
MICRO URNS: ABNORMAL
MONOCYTES # BLD AUTO: 0.46 K/UL (ref 0–0.85)
MONOCYTES NFR BLD AUTO: 3 % (ref 0–13.4)
NEUTROPHILS # BLD AUTO: 14.25 K/UL (ref 2–7.15)
NEUTROPHILS NFR BLD: 91.9 % (ref 44–72)
NITRITE UR QL STRIP.AUTO: NEGATIVE
NRBC # BLD AUTO: 0 K/UL
NRBC BLD AUTO-RTO: 0 /100 WBC
OPIATES UR QL SCN: NEGATIVE
OXYCODONE UR QL SCN: NEGATIVE
PCP UR QL SCN: NEGATIVE
PH UR STRIP.AUTO: 8.5 [PH]
PLATELET # BLD AUTO: 308 K/UL (ref 164–446)
PMV BLD AUTO: 10.3 FL (ref 9–12.9)
POTASSIUM SERPL-SCNC: 3.2 MMOL/L (ref 3.6–5.5)
PROPOXYPH UR QL SCN: NEGATIVE
PROT SERPL-MCNC: 8.2 G/DL (ref 6–8.2)
PROT UR QL STRIP: 300 MG/DL
RBC # BLD AUTO: 5.47 M/UL (ref 4.2–5.4)
RBC # URNS HPF: ABNORMAL /HPF
RBC UR QL AUTO: ABNORMAL
RENAL EPI CELLS #/AREA URNS HPF: ABNORMAL /HPF
SODIUM SERPL-SCNC: 138 MMOL/L (ref 135–145)
SP GR UR STRIP.AUTO: 1.03
UROBILINOGEN UR STRIP.AUTO-MCNC: 0.2 MG/DL
WBC # BLD AUTO: 15.5 K/UL (ref 4.8–10.8)
WBC #/AREA URNS HPF: ABNORMAL /HPF

## 2017-12-14 PROCEDURE — 96375 TX/PRO/DX INJ NEW DRUG ADDON: CPT

## 2017-12-14 PROCEDURE — 87086 URINE CULTURE/COLONY COUNT: CPT

## 2017-12-14 PROCEDURE — 700105 HCHG RX REV CODE 258: Performed by: HOSPITALIST

## 2017-12-14 PROCEDURE — 81001 URINALYSIS AUTO W/SCOPE: CPT

## 2017-12-14 PROCEDURE — 96361 HYDRATE IV INFUSION ADD-ON: CPT

## 2017-12-14 PROCEDURE — 96374 THER/PROPH/DIAG INJ IV PUSH: CPT

## 2017-12-14 PROCEDURE — 700111 HCHG RX REV CODE 636 W/ 250 OVERRIDE (IP): Performed by: HOSPITALIST

## 2017-12-14 PROCEDURE — 306588 SLEEVE,VASO CALF MED: Performed by: HOSPITALIST

## 2017-12-14 PROCEDURE — G0378 HOSPITAL OBSERVATION PER HR: HCPCS

## 2017-12-14 PROCEDURE — 80307 DRUG TEST PRSMV CHEM ANLYZR: CPT

## 2017-12-14 PROCEDURE — 84703 CHORIONIC GONADOTROPIN ASSAY: CPT

## 2017-12-14 PROCEDURE — 99220 PR INITIAL OBSERVATION CARE,LEVL III: CPT | Mod: 25 | Performed by: HOSPITALIST

## 2017-12-14 PROCEDURE — 700105 HCHG RX REV CODE 258: Performed by: EMERGENCY MEDICINE

## 2017-12-14 PROCEDURE — 96376 TX/PRO/DX INJ SAME DRUG ADON: CPT

## 2017-12-14 PROCEDURE — 99407 BEHAV CHNG SMOKING > 10 MIN: CPT | Performed by: HOSPITALIST

## 2017-12-14 PROCEDURE — 83690 ASSAY OF LIPASE: CPT

## 2017-12-14 PROCEDURE — 85025 COMPLETE CBC W/AUTO DIFF WBC: CPT

## 2017-12-14 PROCEDURE — 80053 COMPREHEN METABOLIC PANEL: CPT

## 2017-12-14 PROCEDURE — 99285 EMERGENCY DEPT VISIT HI MDM: CPT

## 2017-12-14 PROCEDURE — 700111 HCHG RX REV CODE 636 W/ 250 OVERRIDE (IP): Performed by: EMERGENCY MEDICINE

## 2017-12-14 PROCEDURE — 83605 ASSAY OF LACTIC ACID: CPT | Mod: 91

## 2017-12-14 RX ORDER — SODIUM CHLORIDE 9 MG/ML
INJECTION, SOLUTION INTRAVENOUS CONTINUOUS
Status: DISCONTINUED | OUTPATIENT
Start: 2017-12-14 | End: 2017-12-15

## 2017-12-14 RX ORDER — SODIUM CHLORIDE 9 MG/ML
500 INJECTION, SOLUTION INTRAVENOUS ONCE
Status: DISCONTINUED | OUTPATIENT
Start: 2017-12-14 | End: 2017-12-15 | Stop reason: HOSPADM

## 2017-12-14 RX ORDER — ONDANSETRON 2 MG/ML
8 INJECTION INTRAMUSCULAR; INTRAVENOUS ONCE
Status: COMPLETED | OUTPATIENT
Start: 2017-12-14 | End: 2017-12-14

## 2017-12-14 RX ORDER — SODIUM CHLORIDE 9 MG/ML
2000 INJECTION, SOLUTION INTRAVENOUS ONCE
Status: COMPLETED | OUTPATIENT
Start: 2017-12-14 | End: 2017-12-14

## 2017-12-14 RX ORDER — MORPHINE SULFATE 4 MG/ML
2 INJECTION, SOLUTION INTRAMUSCULAR; INTRAVENOUS
Status: DISCONTINUED | OUTPATIENT
Start: 2017-12-14 | End: 2017-12-15 | Stop reason: HOSPADM

## 2017-12-14 RX ORDER — ONDANSETRON 2 MG/ML
4 INJECTION INTRAMUSCULAR; INTRAVENOUS EVERY 4 HOURS PRN
Status: DISCONTINUED | OUTPATIENT
Start: 2017-12-14 | End: 2017-12-15 | Stop reason: HOSPADM

## 2017-12-14 RX ORDER — HALOPERIDOL 5 MG/ML
5 INJECTION INTRAMUSCULAR ONCE
Status: COMPLETED | OUTPATIENT
Start: 2017-12-14 | End: 2017-12-14

## 2017-12-14 RX ORDER — BISACODYL 10 MG
10 SUPPOSITORY, RECTAL RECTAL
Status: DISCONTINUED | OUTPATIENT
Start: 2017-12-14 | End: 2017-12-15 | Stop reason: HOSPADM

## 2017-12-14 RX ORDER — POLYETHYLENE GLYCOL 3350 17 G/17G
1 POWDER, FOR SOLUTION ORAL
Status: DISCONTINUED | OUTPATIENT
Start: 2017-12-14 | End: 2017-12-15 | Stop reason: HOSPADM

## 2017-12-14 RX ORDER — PROMETHAZINE HYDROCHLORIDE 25 MG/1
12.5-25 SUPPOSITORY RECTAL EVERY 4 HOURS PRN
Status: DISCONTINUED | OUTPATIENT
Start: 2017-12-14 | End: 2017-12-15 | Stop reason: HOSPADM

## 2017-12-14 RX ORDER — PROMETHAZINE HYDROCHLORIDE 25 MG/1
12.5-25 TABLET ORAL EVERY 4 HOURS PRN
Status: DISCONTINUED | OUTPATIENT
Start: 2017-12-14 | End: 2017-12-15 | Stop reason: HOSPADM

## 2017-12-14 RX ORDER — ONDANSETRON 4 MG/1
4 TABLET, ORALLY DISINTEGRATING ORAL EVERY 4 HOURS PRN
Status: DISCONTINUED | OUTPATIENT
Start: 2017-12-14 | End: 2017-12-15 | Stop reason: HOSPADM

## 2017-12-14 RX ORDER — ACETAMINOPHEN 325 MG/1
650 TABLET ORAL EVERY 6 HOURS PRN
Status: DISCONTINUED | OUTPATIENT
Start: 2017-12-14 | End: 2017-12-15 | Stop reason: HOSPADM

## 2017-12-14 RX ORDER — AMOXICILLIN 250 MG
2 CAPSULE ORAL 2 TIMES DAILY
Status: DISCONTINUED | OUTPATIENT
Start: 2017-12-14 | End: 2017-12-15 | Stop reason: HOSPADM

## 2017-12-14 RX ORDER — DIPHENHYDRAMINE HYDROCHLORIDE 50 MG/ML
50 INJECTION INTRAMUSCULAR; INTRAVENOUS ONCE
Status: COMPLETED | OUTPATIENT
Start: 2017-12-14 | End: 2017-12-14

## 2017-12-14 RX ADMIN — ONDANSETRON 4 MG: 2 INJECTION INTRAMUSCULAR; INTRAVENOUS at 22:37

## 2017-12-14 RX ADMIN — SODIUM CHLORIDE: 9 INJECTION, SOLUTION INTRAVENOUS at 22:36

## 2017-12-14 RX ADMIN — ONDANSETRON 8 MG: 2 INJECTION INTRAMUSCULAR; INTRAVENOUS at 18:50

## 2017-12-14 RX ADMIN — DIPHENHYDRAMINE HYDROCHLORIDE 50 MG: 50 INJECTION, SOLUTION INTRAMUSCULAR; INTRAVENOUS at 18:50

## 2017-12-14 RX ADMIN — SODIUM CHLORIDE 2000 ML: 9 INJECTION, SOLUTION INTRAVENOUS at 18:51

## 2017-12-14 RX ADMIN — HALOPERIDOL LACTATE 5 MG: 5 INJECTION, SOLUTION INTRAMUSCULAR at 18:50

## 2017-12-14 RX ADMIN — MORPHINE SULFATE 2 MG: 4 INJECTION INTRAVENOUS at 22:37

## 2017-12-14 ASSESSMENT — COPD QUESTIONNAIRES
DURING THE PAST 4 WEEKS HOW MUCH DID YOU FEEL SHORT OF BREATH: NONE/LITTLE OF THE TIME
COPD SCREENING SCORE: 2
HAVE YOU SMOKED AT LEAST 100 CIGARETTES IN YOUR ENTIRE LIFE: YES
DO YOU EVER COUGH UP ANY MUCUS OR PHLEGM?: NO/ONLY WITH OCCASIONAL COLDS OR INFECTIONS

## 2017-12-14 ASSESSMENT — PAIN SCALES - GENERAL
PAINLEVEL_OUTOF10: 8
PAINLEVEL_OUTOF10: 10

## 2017-12-14 ASSESSMENT — LIFESTYLE VARIABLES
EVER_SMOKED: YES
ALCOHOL_USE: NO

## 2017-12-15 VITALS
RESPIRATION RATE: 16 BRPM | DIASTOLIC BLOOD PRESSURE: 53 MMHG | HEART RATE: 60 BPM | TEMPERATURE: 98.1 F | WEIGHT: 169.97 LBS | BODY MASS INDEX: 29.02 KG/M2 | SYSTOLIC BLOOD PRESSURE: 100 MMHG | HEIGHT: 64 IN | OXYGEN SATURATION: 100 %

## 2017-12-15 PROBLEM — F32.A DEPRESSION: Status: ACTIVE | Noted: 2017-12-15

## 2017-12-15 PROBLEM — E87.20 METABOLIC ACIDOSIS: Status: ACTIVE | Noted: 2017-12-15

## 2017-12-15 PROBLEM — N39.0 UTI (URINARY TRACT INFECTION): Status: ACTIVE | Noted: 2017-12-15

## 2017-12-15 PROBLEM — R11.15 CYCLIC VOMITING SYNDROME: Status: RESOLVED | Noted: 2017-12-14 | Resolved: 2017-12-15

## 2017-12-15 PROBLEM — E87.6 HYPOKALEMIA: Status: ACTIVE | Noted: 2017-12-15

## 2017-12-15 PROBLEM — E86.0 DEHYDRATION: Status: ACTIVE | Noted: 2017-12-15

## 2017-12-15 PROBLEM — Z72.0 TOBACCO USE: Status: ACTIVE | Noted: 2017-12-15

## 2017-12-15 LAB
ALBUMIN SERPL BCP-MCNC: 3.6 G/DL (ref 3.2–4.9)
ALBUMIN/GLOB SERPL: 1.6 G/DL
ALP SERPL-CCNC: 43 U/L (ref 30–99)
ALT SERPL-CCNC: 6 U/L (ref 2–50)
ANION GAP SERPL CALC-SCNC: 8 MMOL/L (ref 0–11.9)
AST SERPL-CCNC: 10 U/L (ref 12–45)
BASOPHILS # BLD AUTO: 0.2 % (ref 0–1.8)
BASOPHILS # BLD: 0.02 K/UL (ref 0–0.12)
BILIRUB SERPL-MCNC: 0.5 MG/DL (ref 0.1–1.5)
BUN SERPL-MCNC: 8 MG/DL (ref 8–22)
CALCIUM SERPL-MCNC: 8.2 MG/DL (ref 8.5–10.5)
CHLORIDE SERPL-SCNC: 109 MMOL/L (ref 96–112)
CO2 SERPL-SCNC: 21 MMOL/L (ref 20–33)
CREAT SERPL-MCNC: 0.62 MG/DL (ref 0.5–1.4)
EOSINOPHIL # BLD AUTO: 0.01 K/UL (ref 0–0.51)
EOSINOPHIL NFR BLD: 0.1 % (ref 0–6.9)
ERYTHROCYTE [DISTWIDTH] IN BLOOD BY AUTOMATED COUNT: 42 FL (ref 35.9–50)
GFR SERPL CREATININE-BSD FRML MDRD: >60 ML/MIN/1.73 M 2
GLOBULIN SER CALC-MCNC: 2.3 G/DL (ref 1.9–3.5)
GLUCOSE SERPL-MCNC: 115 MG/DL (ref 65–99)
HCT VFR BLD AUTO: 36.7 % (ref 37–47)
HGB BLD-MCNC: 12.4 G/DL (ref 12–16)
IMM GRANULOCYTES # BLD AUTO: 0.03 K/UL (ref 0–0.11)
IMM GRANULOCYTES NFR BLD AUTO: 0.2 % (ref 0–0.9)
LYMPHOCYTES # BLD AUTO: 1.45 K/UL (ref 1–4.8)
LYMPHOCYTES NFR BLD: 11.1 % (ref 22–41)
MCH RBC QN AUTO: 28.8 PG (ref 27–33)
MCHC RBC AUTO-ENTMCNC: 34.3 G/DL (ref 33.6–35)
MCV RBC AUTO: 83.8 FL (ref 81.4–97.8)
MONOCYTES # BLD AUTO: 0.64 K/UL (ref 0–0.85)
MONOCYTES NFR BLD AUTO: 4.9 % (ref 0–13.4)
NEUTROPHILS # BLD AUTO: 10.92 K/UL (ref 2–7.15)
NEUTROPHILS NFR BLD: 83.5 % (ref 44–72)
NRBC # BLD AUTO: 0 K/UL
NRBC BLD AUTO-RTO: 0 /100 WBC
PLATELET # BLD AUTO: 217 K/UL (ref 164–446)
PMV BLD AUTO: 10 FL (ref 9–12.9)
POTASSIUM SERPL-SCNC: 3.5 MMOL/L (ref 3.6–5.5)
PROT SERPL-MCNC: 5.9 G/DL (ref 6–8.2)
RBC # BLD AUTO: 4.31 M/UL (ref 4.2–5.4)
SODIUM SERPL-SCNC: 138 MMOL/L (ref 135–145)
WBC # BLD AUTO: 13.1 K/UL (ref 4.8–10.8)

## 2017-12-15 PROCEDURE — 36415 COLL VENOUS BLD VENIPUNCTURE: CPT

## 2017-12-15 PROCEDURE — 85025 COMPLETE CBC W/AUTO DIFF WBC: CPT

## 2017-12-15 PROCEDURE — 700105 HCHG RX REV CODE 258: Performed by: HOSPITALIST

## 2017-12-15 PROCEDURE — 700102 HCHG RX REV CODE 250 W/ 637 OVERRIDE(OP): Performed by: HOSPITALIST

## 2017-12-15 PROCEDURE — 99220 PR INITIAL OBSERVATION CARE,LEVL III: CPT | Performed by: HOSPITALIST

## 2017-12-15 PROCEDURE — 96361 HYDRATE IV INFUSION ADD-ON: CPT

## 2017-12-15 PROCEDURE — A9270 NON-COVERED ITEM OR SERVICE: HCPCS | Performed by: HOSPITALIST

## 2017-12-15 PROCEDURE — G0378 HOSPITAL OBSERVATION PER HR: HCPCS

## 2017-12-15 PROCEDURE — 80053 COMPREHEN METABOLIC PANEL: CPT

## 2017-12-15 RX ORDER — PROMETHAZINE HYDROCHLORIDE 12.5 MG/1
12.5-25 SUPPOSITORY RECTAL EVERY 4 HOURS PRN
Qty: 10 SUPPOSITORY | Refills: 3 | Status: SHIPPED | OUTPATIENT
Start: 2017-12-15 | End: 2018-10-02

## 2017-12-15 RX ORDER — POTASSIUM CHLORIDE 20 MEQ/1
40 TABLET, EXTENDED RELEASE ORAL ONCE
Status: COMPLETED | OUTPATIENT
Start: 2017-12-15 | End: 2017-12-15

## 2017-12-15 RX ADMIN — POTASSIUM CHLORIDE 40 MEQ: 1500 TABLET, EXTENDED RELEASE ORAL at 01:03

## 2017-12-15 RX ADMIN — SODIUM CHLORIDE: 9 INJECTION, SOLUTION INTRAVENOUS at 05:27

## 2017-12-15 RX ADMIN — SERTRALINE 50 MG: 50 TABLET, FILM COATED ORAL at 01:12

## 2017-12-15 ASSESSMENT — ENCOUNTER SYMPTOMS
SHORTNESS OF BREATH: 0
HEADACHES: 0
SPEECH CHANGE: 0
ABDOMINAL PAIN: 1
FEVER: 0
PALPITATIONS: 0
DIARRHEA: 0
DEPRESSION: 0
HEARTBURN: 0
DIZZINESS: 0
BACK PAIN: 0
FLANK PAIN: 0
COUGH: 0
WEAKNESS: 0
MYALGIAS: 0
CHILLS: 0
BRUISES/BLEEDS EASILY: 0
SPUTUM PRODUCTION: 0
VOMITING: 1
CONSTIPATION: 0
BLURRED VISION: 0
NERVOUS/ANXIOUS: 0
NAUSEA: 1
DOUBLE VISION: 0

## 2017-12-15 ASSESSMENT — PAIN SCALES - GENERAL
PAINLEVEL_OUTOF10: 0
PAINLEVEL_OUTOF10: 0

## 2017-12-15 NOTE — ASSESSMENT & PLAN NOTE
UA dirty and leukocytosis with abdominal pain  Started on Rocephin and follow cultures could be contributing to nausea vomiting abdominal pain

## 2017-12-15 NOTE — DISCHARGE PLANNING
Care Transition Team Assessment    Information Source  Orientation : Oriented x 4  Information Given By: Patient  Informant's Name:  (patient)  Who is responsible for making decisions for patient? : Patient    Readmission Evaluation  Is this a readmission?: No    Elopement Risk  Legal Hold: No  Ambulatory or Self Mobile in Wheelchair: No-Not an Elopement Risk  Elopement Risk: Not at Risk for Elopement    Interdisciplinary Discharge Planning  Does Admitting Nurse Feel This Could be a Complex Discharge?: No  Primary Care Physician:  (Han TRAYLOR)  Lives with - Patient's Self Care Capacity: Spouse  Support Systems: Family Member(s)  Housing / Facility: 1 Story Apartment / Condo  Do You Take your Prescribed Medications Regularly: Yes  Able to Return to Previous ADL's: Yes  Mobility Issues: No  Patient Expects to be Discharged to::  (Home)  Assistance Needed: No  Durable Medical Equipment: Not Applicable    Discharge Preparedness  What is your plan after discharge?:  (Home)  What are your discharge supports?: Spouse  Prior Functional Level: Ambulatory  Difficulity with ADLs: None  Difficulity with IADLs: None    Functional Assesment  Prior Functional Level: Ambulatory    Finances  Financial Barriers to Discharge: No  Prescription Coverage: Yes    Vision / Hearing Impairment  Vision Impairment : Yes  Right Eye Vision: Wears Glasses  Left Eye Vision: Wears Glasses  Hearing Impairment : No    Values / Beliefs / Concerns  Values / Beliefs Concerns : No    Advance Directive  Advance Directive?: None    Domestic Abuse  Have you ever been the victim of abuse or violence?: No  Physical Abuse or Sexual Abuse: No  Verbal Abuse or Emotional Abuse: No         Discharge Risks or Barriers  Discharge risks or barriers?: No    Anticipated Discharge Information  Anticipated discharge disposition: Home  Discharge Address:  ([id number])

## 2017-12-15 NOTE — ED NOTES
Pt. Ambulated to the bathroom with steady gait. Pt. Assisted back into [facility]. Call light within reach. Will continue to monitor.

## 2017-12-15 NOTE — ED PROVIDER NOTES
"ED Provider Note    CHIEF COMPLAINT  Chief Complaint   Patient presents with   • N/V     since 0230   • Abdominal Pain        HPI  Rikki Toscano is a 32 y.o. female who presentsTo the ED with complaints of abdominal pain, nausea, vomiting. Patient had similar abscess in September. Apparently she was admitted because of a pyelonephritis, partially treated with Bactrim and she had intractable nausea, vomiting. Currently, she started vomiting about 2 o'clock this morning. She's not been on any antibiotics. It just seems to hit her out of \"nowhere.\" She describes continuous nausea, vomiting, just diffuse abdominal pain. Describes chills but no fevers. Denies any dysuria. Presents for evaluation.    REVIEW OF SYSTEMS  See HPI for further details. All other systems are negative.     PAST MEDICAL HISTORY  Past Medical History:   Diagnosis Date   • ASTHMA        FAMILY HISTORY  No family history on file.  Patient's family history has been discussed and is been found to be noncontributory to his present illness  SOCIAL HISTORY  Social History     Social History   • Marital status: Single     Spouse name: N/A   • Number of children: N/A   • Years of education: N/A     Social History Main Topics   • Smoking status: Current Every Day Smoker     Packs/day: 0.50     Years: 10.00     Types: Cigarettes   • Smokeless tobacco: Not on file      Comment: quit 3/25/10   • Alcohol use No   • Drug use: No   • Sexual activity: Not on file     Other Topics Concern   • Not on file     Social History Narrative   • No narrative on file      [facility]        SURGICAL HISTORY  Past Surgical History:   Procedure Laterality Date   • GYN SURGERY  2010           CURRENT MEDICATIONS  Home Medications    **Home medications have not yet been reviewed for this encounter**       No current facility-administered medications on file prior to encounter.      Current Outpatient Prescriptions on File Prior to Encounter "   Medication Sig Dispense Refill   • albuterol 108 (90 Base) MCG/ACT Aero Soln inhalation aerosol Inhale 2 Puffs by mouth every 6 hours as needed for Shortness of Breath.     • ondansetron (ZOFRAN ODT) 4 MG TABLET DISPERSIBLE Take 4 mg by mouth every 8 hours as needed for Nausea/Vomiting.     • sertraline (ZOLOFT) 50 MG Tab Take 50 mg by mouth every day.     • ketorolac (TORADOL) 10 MG Tab Take 10 mg by mouth every 8 hours as needed for Moderate Pain.           ALLERGIES  No Known Allergies    PHYSICAL EXAM  VITAL SIGNS: /104   Pulse (!) 112   Temp 36.7 °C (98 °F)   Resp 18   Wt 77.1 kg (169 lb 15.6 oz)   SpO2 97%   BMI 29.18 kg/m²    Pulse Oximetry was obtained. It showed a reading of Pulse Oximetry: 99 %.  I interpreted this as not hypoxic.     Constitutional: Ill appearance, actively vomiting  HENT: Normocephalic, Atraumatic, Bilateral external ears normal, bilateral tympanic membranes normal, Oropharynx moist, No oral exudates, Nose normal.   Eyes: Pupils are equal round and react to light, extraocular motions are intact, conjunctiva is normal, there are no signs of exudate.   Neck: Supple, no cervical lymphadenopathy, no meningeal signs..   Lymphatic: No lymphadenopathy noted.   Cardiovascular: Regular rate and rhythm without murmurs gallops or rubs.   Thorax & Lungs: Lungs are clear to auscultation bilaterally, there are no wheezes no rales. Chest wall is nontender.  Abdomen: Soft, diffusely tender. Bowel sounds are hypoactive but present  Skin: Warm, Dry, No erythema,   Back: No tenderness, No CVA tenderness.   Extremities: Intact distal pulses, no clubbing, no cyanosis, no edema, nontender.  Neurologic: Alert & oriented x 3, Normal motor function, Normal sensory function, No focal deficits noted.         RADIOLOGY/PROCEDURES  No orders to display       Results for orders placed or performed during the hospital encounter of 12/14/17   URINALYSIS,CULTURE IF INDICATED   Result Value Ref Range     Color Yellow     Character Clear     Specific Gravity 1.032 <1.035    Ph 8.5 (A) 5.0 - 8.0    Glucose Negative Negative mg/dL    Ketones >=160 Negative mg/dL    Protein 300 (A) Negative mg/dL    Bilirubin Negative Negative    Urobilinogen, Urine 0.2 Negative    Nitrite Negative Negative    Leukocyte Esterase Trace (A) Negative    Occult Blood Moderate (A) Negative    Micro Urine Req Microscopic     Culture Indicated Yes UA Culture   HCG Qual Serum   Result Value Ref Range    Beta-Hcg Qualitative Serum Negative Negative   CBC WITH DIFFERENTIAL   Result Value Ref Range    WBC 15.5 (H) 4.8 - 10.8 K/uL    RBC 5.47 (H) 4.20 - 5.40 M/uL    Hemoglobin 15.7 12.0 - 16.0 g/dL    Hematocrit 45.3 37.0 - 47.0 %    MCV 82.8 81.4 - 97.8 fL    MCH 28.7 27.0 - 33.0 pg    MCHC 34.7 33.6 - 35.0 g/dL    RDW 40.7 35.9 - 50.0 fL    Platelet Count 308 164 - 446 K/uL    MPV 10.3 9.0 - 12.9 fL    Neutrophils-Polys 91.90 (H) 44.00 - 72.00 %    Lymphocytes 4.50 (L) 22.00 - 41.00 %    Monocytes 3.00 0.00 - 13.40 %    Eosinophils 0.00 0.00 - 6.90 %    Basophils 0.10 0.00 - 1.80 %    Immature Granulocytes 0.50 0.00 - 0.90 %    Nucleated RBC 0.00 /100 WBC    Neutrophils (Absolute) 14.25 (H) 2.00 - 7.15 K/uL    Lymphs (Absolute) 0.70 (L) 1.00 - 4.80 K/uL    Monos (Absolute) 0.46 0.00 - 0.85 K/uL    Eos (Absolute) 0.00 0.00 - 0.51 K/uL    Baso (Absolute) 0.01 0.00 - 0.12 K/uL    Immature Granulocytes (abs) 0.07 0.00 - 0.11 K/uL    NRBC (Absolute) 0.00 K/uL   COMP METABOLIC PANEL   Result Value Ref Range    Sodium 138 135 - 145 mmol/L    Potassium 3.2 (L) 3.6 - 5.5 mmol/L    Chloride 102 96 - 112 mmol/L    Co2 17 (L) 20 - 33 mmol/L    Anion Gap 19.0 (H) 0.0 - 11.9    Glucose 127 (H) 65 - 99 mg/dL    Bun 12 8 - 22 mg/dL    Creatinine 0.67 0.50 - 1.40 mg/dL    Calcium 9.9 8.5 - 10.5 mg/dL    AST(SGOT) 14 12 - 45 U/L    ALT(SGPT) 8 2 - 50 U/L    Alkaline Phosphatase 62 30 - 99 U/L    Total Bilirubin 0.8 0.1 - 1.5 mg/dL    Albumin 4.8 3.2 - 4.9 g/dL     Total Protein 8.2 6.0 - 8.2 g/dL    Globulin 3.4 1.9 - 3.5 g/dL    A-G Ratio 1.4 g/dL   LIPASE   Result Value Ref Range    Lipase 7 (L) 11 - 82 U/L   LACTIC ACID   Result Value Ref Range    Lactic Acid 4.1 (HH) 0.5 - 2.0 mmol/L   URINE MICROSCOPIC (W/UA)   Result Value Ref Range    WBC 0-2 /hpf    RBC  (A) /hpf    Bacteria Moderate (A) None /hpf    Epithelial Cells Many (A) /hpf    Epithelial Cells Renal Few /hpf    Hyaline Cast 11-20 (A) /lpf   URINE DRUG SCREEN   Result Value Ref Range    Amphetamines Urine Negative Negative    Barbiturates Negative Negative    Benzodiazepines Negative Negative    Cocaine Metabolite Negative Negative    Methadone Negative Negative    Opiates Negative Negative    Oxycodone Negative Negative    Phencyclidine -Pcp Negative Negative    Propoxyphene Negative Negative    Cannabinoid Metab Positive (A) Negative   LACTIC ACID   Result Value Ref Range    Lactic Acid 1.4 0.5 - 2.0 mmol/L   ESTIMATED GFR   Result Value Ref Range    GFR If African American >60 >60 mL/min/1.73 m 2    GFR If Non African American >60 >60 mL/min/1.73 m 2         COURSE & MEDICAL DECISION MAKING  Pertinent Labs & Imaging studies reviewed. (See chart for details)  7:14 PM Reevaluated the patient after the Haldol and Benadryl. Abdominal exam is benign. She has no tenderness at all. Resting comfortably. Lactic gas was reported as a 4. The patient be given fluids and rechecked on the lactic acid.    Patient presents for evaluation. Initially she had intractable nausea, vomiting, IV was established, was given initially 1 L of normal saline until laboratory studies show that her lactic acid was greater than 4. She was then given a 30 mL/kg bolus of normal saline which ended up to be 2.5 L. Patient had been given the above medications and on 714. She was significant improved. Lactic acid is come down to normal 1.4, so do feel her lactic acidosis is secondary to her intractable nausea, vomiting, and not sepsis.  However, I did give her by mouth challenge and she started vomiting again. Urine tox screens positive for cannabinoid metabolites very possible this is a cannabinoid hyperemesis syndrome. The patient's had bouts of this before. She does have some red blood cells in her urine. He states that she might be coming around her menstrual cycle, but states she hasn't started yet to her knowledge. Denies any dysuria. Upon reevaluation, her abdominal exam was benign, so I do not feel that she has appendicitis or intra-abdominal process at this time. Because of her continued nausea, vomiting, we will admit the patient for further fluid hydration, observation, treatment is necessary. I spoken to the hospitalist for this admission.    FINAL IMPRESSION  1. Intractable cyclical vomiting with nausea    2. Dehydration          Electronically signed by: Michael Naidu, 12/14/2017 6:51 PM

## 2017-12-15 NOTE — ASSESSMENT & PLAN NOTE
Concern for cyclic vomiting syndrome given positive cannabinoids and UDS and recent admission for similar presentation  However patient denies any marijuana use recently  We'll treat with IV fluids and pain control and antiemetics  Advanced to diet as tolerated

## 2017-12-15 NOTE — DISCHARGE INSTRUCTIONS
Discharge Instructions    Discharged to home by car with friend. Discharged via wheelchair, hospital escort: Yes.  Special equipment needed: Not Applicable    Be sure to schedule a follow-up appointment with your primary care doctor or any specialists as instructed.     Discharge Plan:   Diet Plan: Discussed  Activity Level: Discussed  Smoking Cessation Offered: Patient Counseled  Confirmed Follow up Appointment: Appointment Scheduled  Confirmed Symptoms Management: Discussed  Medication Reconciliation Updated: Yes  Influenza Vaccine Indication: Patient Refuses    I understand that a diet low in cholesterol, fat, and sodium is recommended for good health. Unless I have been given specific instructions below for another diet, I accept this instruction as my diet prescription.   Other diet:     Special Instructions: None    · Is patient discharged on Warfarin / Coumadin?   No     · Is patient Post Blood Transfusion?  No    Nausea and Vomiting  Nausea is a sick feeling that often comes before throwing up (vomiting). Vomiting is a reflex where stomach contents come out of your mouth. Vomiting can cause severe loss of body fluids (dehydration). Children and elderly adults can become dehydrated quickly, especially if they also have diarrhea. Nausea and vomiting are symptoms of a condition or disease. It is important to find the cause of your symptoms.  CAUSES   · Direct irritation of the stomach lining. This irritation can result from increased acid production (gastroesophageal reflux disease), infection, food poisoning, taking certain medicines (such as nonsteroidal anti-inflammatory drugs), alcohol use, or tobacco use.  · Signals from the brain. These signals could be caused by a headache, heat exposure, an inner ear disturbance, increased pressure in the brain from injury, infection, a tumor, or a concussion, pain, emotional stimulus, or metabolic problems.  · An obstruction in the gastrointestinal tract (bowel  obstruction).  · Illnesses such as diabetes, hepatitis, gallbladder problems, appendicitis, kidney problems, cancer, sepsis, atypical symptoms of a heart attack, or eating disorders.  · Medical treatments such as chemotherapy and radiation.  · Receiving medicine that makes you sleep (general anesthetic) during surgery.  DIAGNOSIS  Your caregiver may ask for tests to be done if the problems do not improve after a few days. Tests may also be done if symptoms are severe or if the reason for the nausea and vomiting is not clear. Tests may include:  · Urine tests.  · Blood tests.  · Stool tests.  · Cultures (to look for evidence of infection).  · X-rays or other imaging studies.  Test results can help your caregiver make decisions about treatment or the need for additional tests.  TREATMENT  You need to stay well hydrated. Drink frequently but in small amounts. You may wish to drink water, sports drinks, clear broth, or eat frozen ice pops or gelatin dessert to help stay hydrated. When you eat, eating slowly may help prevent nausea. There are also some antinausea medicines that may help prevent nausea.  HOME CARE INSTRUCTIONS   · Take all medicine as directed by your caregiver.  · If you do not have an appetite, do not force yourself to eat. However, you must continue to drink fluids.  · If you have an appetite, eat a normal diet unless your caregiver tells you differently.  ¨ Eat a variety of complex carbohydrates (rice, wheat, potatoes, bread), lean meats, yogurt, fruits, and vegetables.  ¨ Avoid high-fat foods because they are more difficult to digest.  · Drink enough water and fluids to keep your urine clear or pale yellow.  · If you are dehydrated, ask your caregiver for specific rehydration instructions. Signs of dehydration may include:  ¨ Severe thirst.  ¨ Dry lips and mouth.  ¨ Dizziness.  ¨ Dark urine.  ¨ Decreasing urine frequency and amount.  ¨ Confusion.  ¨ Rapid breathing or pulse.  SEEK IMMEDIATE MEDICAL  CARE IF:   · You have blood or brown flecks (like coffee grounds) in your vomit.  · You have black or bloody stools.  · You have a severe headache or stiff neck.  · You are confused.  · You have severe abdominal pain.  · You have chest pain or trouble breathing.  · You do not urinate at least once every 8 hours.  · You develop cold or clammy skin.  · You continue to vomit for longer than 24 to 48 hours.  · You have a fever.  MAKE SURE YOU:   · Understand these instructions.  · Will watch your condition.  · Will get help right away if you are not doing well or get worse.     This information is not intended to replace advice given to you by your health care provider. Make sure you discuss any questions you have with your health care provider.     Document Released: 12/18/2006 Document Revised: 03/11/2013 Document Reviewed: 05/16/2012  [employer] Patient Education ©2016 [employer]

## 2017-12-15 NOTE — ED NOTES
PO challenge unsuccessful. Pt. Has had small amounts of emesis after crackers and juice. Dr. Naidu notified. Pt's chart up for re-eval.

## 2017-12-15 NOTE — DISCHARGE SUMMARY
CHIEF COMPLAINT ON ADMISSION  Chief Complaint   Patient presents with   • N/V     since 0230   • Abdominal Pain       CODE STATUS  Prior    HPI & HOSPITAL COURSE  This is a 32 y.o. female here with intractable vomiting and abdominal pain. Her symptoms started at 0200 yesterday morning with nausea and vomiting with diffuse abdominal pain. The pain was located in her epigastric area, non-radiating, crampy type discomfort. She denied chills, fever, dysuria, or flank pain. She had an episode similar to this back in September and was admitted for pyelonephritis.   Hospital workup showed a urinalysis with trace Leuk. Esterase, 0-2 WBC's, moderate bacteria, many epithelial cells, and 11-20 hyaline cast - likely contaminated specimen. She was given one dose of IV Rocephin. She was given IV fluids and antiemetics in the ER and by the time she arrived to the floor her nausea and vomiting had resolved. During her hospital course she significantly improved. She had no further episodes of vomiting or nausea. She denies any chest pain, SOB, dizziness, or weakness. She is tolerating a regular diet and able to ambulate independently without symptoms.    Therefore, she is discharged in good and stable condition with close outpatient follow-up.    DISCHARGE PROBLEM LIST  Principal Problem (Resolved):    Cyclic vomiting syndrome POA: Unknown  Active Problems:    Depression POA: Unknown    Metabolic acidosis POA: Unknown    Hypokalemia POA: Unknown    Tobacco use POA: Unknown    UTI (urinary tract infection) POA: Unknown    Dehydration POA: Unknown      FOLLOW UP  No future appointments.  [facility]  [address] [place] [id number]  [phone]    In 1 week      MEDICATIONS ON DISCHARGE   Rikki Toscano   Home Medication Instructions GEORGIANA:[id number]    Printed on:12/15/17 8728   Medication Information                      promethazine (PHENERGAN) 12.5 MG Suppos  Insert 1-2 Suppositories in rectum every four  hours as needed for Nausea/Vomiting (if no relief from ondansetron or if ondansetron is not ordered or if unable to tolerate PO).             sertraline (ZOLOFT) 50 MG Tab  Take 50 mg by mouth every evening.                 DIET  No orders of the defined types were placed in this encounter.      ACTIVITY  As tolerated.  Weight bearing as tolerated    CONSULTATIONS  NA    PROCEDURES  NA    LABORATORY  Lab Results   Component Value Date/Time    SODIUM 138 12/15/2017 01:10 AM    POTASSIUM 3.5 (L) 12/15/2017 01:10 AM    CHLORIDE 109 12/15/2017 01:10 AM    CO2 21 12/15/2017 01:10 AM    GLUCOSE 115 (H) 12/15/2017 01:10 AM    BUN 8 12/15/2017 01:10 AM    CREATININE 0.62 12/15/2017 01:10 AM    CREATININE 0.6 05/24/2006 12:05 AM        Lab Results   Component Value Date/Time    WBC 13.1 (H) 12/15/2017 01:10 AM    HEMOGLOBIN 12.4 12/15/2017 01:10 AM    HEMATOCRIT 36.7 (L) 12/15/2017 01:10 AM    PLATELETCT 217 12/15/2017 01:10 AM      Total time of the discharge process 32 minutes   KARY Hwang.

## 2017-12-15 NOTE — H&P
Hospital Medicine History and Physical    Date of Service  2017    Chief Complaint  Chief Complaint   Patient presents with   • N/V     since 0230   • Abdominal Pain       History of Presenting Illness  32 y.o. female who presented 2017 with Plans of abdominal pain nausea vomiting . Similar episode in September admitted because of pyelonephritis she had intractable nausea vomiting at that time. Her nausea vomiting started this morning about 2:00 she feels like there is nausea vomiting his nowhere she complains of diffuse abdominal pain nonradiating mid epigastric feels like a crampy type pain this has been worsening for the past several hours. Also has chills no fevers no dysuria. Nausea vomiting not improved by anti-emetics in the ER .     Primary Care Physician  Alice [facility] M.D.    Consultants  None    Code Status  Full    Review of Systems  Review of Systems   Constitutional: Negative for chills and fever.   HENT: Negative for ear discharge, ear pain and hearing loss.    Eyes: Negative for blurred vision and double vision.   Respiratory: Negative for cough, sputum production and shortness of breath.    Cardiovascular: Negative for chest pain, palpitations and leg swelling.   Gastrointestinal: Positive for abdominal pain, nausea and vomiting. Negative for constipation, diarrhea and heartburn.   Genitourinary: Negative for dysuria and flank pain.   Musculoskeletal: Negative for back pain and myalgias.   Skin: Negative for rash.   Neurological: Negative for dizziness, speech change, weakness and headaches.   Endo/Heme/Allergies: Does not bruise/bleed easily.   Psychiatric/Behavioral: Negative for depression. The patient is not nervous/anxious.         Past Medical History  Past Medical History:   Diagnosis Date   • ASTHMA        Surgical History  Past Surgical History:   Procedure Laterality Date   • GYN SURGERY  2010           Medications  No current facility-administered  medications on file prior to encounter.      Current Outpatient Prescriptions on File Prior to Encounter   Medication Sig Dispense Refill   • sertraline (ZOLOFT) 50 MG Tab Take 50 mg by mouth every evening.         Family History  Reviewed and noncontributory    Social History  Social History   Substance Use Topics   • Smoking status: Current Every Day Smoker     Packs/day: 0.50     Years: 10.00     Types: Cigarettes   • Smokeless tobacco: Not on file      Comment: quit 3/25/10   • Alcohol use No       Allergies  No Known Allergies     Physical Exam  Laboratory   Hemodynamics  Temp (24hrs), Av.7 °C (98 °F), Min:36.7 °C (98 °F), Max:36.7 °C (98 °F)   Temperature: 36.7 °C (98 °F)  Pulse  Av.6  Min: 62  Max: 112    Blood Pressure: 134/104, NIBP: 112/54      Respiratory      Respiration: 16, Pulse Oximetry: 96 %             Physical Exam   Constitutional: She is oriented to person, place, and time. She appears well-developed and well-nourished. She appears distressed.   HENT:   Head: Normocephalic and atraumatic.   Eyes: Conjunctivae and EOM are normal. Pupils are equal, round, and reactive to light.   Neck: Normal range of motion. Neck supple. No JVD present.   Cardiovascular: Normal rate, regular rhythm and normal heart sounds.    No murmur heard.  Pulmonary/Chest: Effort normal and breath sounds normal. No respiratory distress.   Abdominal: Soft. Bowel sounds are normal. She exhibits no distension. There is tenderness.   Musculoskeletal: Normal range of motion. She exhibits no edema.   Neurological: She is alert and oriented to person, place, and time. She exhibits normal muscle tone.   Skin: Skin is warm and dry. No erythema.   Psychiatric: She has a normal mood and affect. Her behavior is normal. Judgment and thought content normal.       Recent Labs      17   1857   WBC  15.5*   RBC  5.47*   HEMOGLOBIN  15.7   HEMATOCRIT  45.3   MCV  82.8   MCH  28.7   MCHC  34.7   RDW  40.7   PLATELETCT  308   MPV   10.3     Recent Labs      12/14/17   1857   SODIUM  138   POTASSIUM  3.2*   CHLORIDE  102   CO2  17*   GLUCOSE  127*   BUN  12   CREATININE  0.67   CALCIUM  9.9     Recent Labs      12/14/17   1857   ALTSGPT  8   ASTSGOT  14   ALKPHOSPHAT  62   TBILIRUBIN  0.8   LIPASE  7*   GLUCOSE  127*                 No results found for: TROPONINI  Urinalysis:    Lab Results  Component Value Date/Time   SPECGRAVITY 1.032 12/14/2017 1859   GLUCOSEUR Negative 12/14/2017 1859   KETONES >=160 12/14/2017 1859   NITRITE Negative 12/14/2017 1859   WBCURINE 0-2 12/14/2017 1859   RBCURINE  (A) 12/14/2017 1859   BACTERIA Moderate (A) 12/14/2017 1859   EPITHELCELL Many (A) 12/14/2017 1859        Imaging  None   Assessment/Plan     I anticipate this patient is appropriate for observation status at this time.    * Cyclic vomiting syndrome   Assessment & Plan    Concern for cyclic vomiting syndrome given positive cannabinoids and UDS and recent admission for similar presentation  However patient denies any marijuana use recently  We'll treat with IV fluids and pain control and antiemetics  Advanced to diet as tolerated        Dehydration   Assessment & Plan    secondary nausea vomiting continue IV fluids        UTI (urinary tract infection)   Assessment & Plan    UA dirty and leukocytosis with abdominal pain  Started on Rocephin and follow cultures could be contributing to nausea vomiting abdominal pain        Tobacco use   Assessment & Plan    Greater than 10 minutes spent with smoking cessation counseling        Hypokalemia   Assessment & Plan    Replace and repeat labs        Metabolic acidosis   Assessment & Plan    Clinically secondary to lactic acid  Continue IV fluids repeat labs        Depression   Assessment & Plan    Continue Zoloft            VTE prophylaxis: SCD

## 2017-12-15 NOTE — ED NOTES
Pt. Provided additional blankets for comfort. Pt. Updated on the POC for fluids to finish and additional lab tests. Call light within reach. Will continue to monitor.

## 2017-12-15 NOTE — ED NOTES
Pt. Updated on the POC to be admitted to the hospital. Call light within reach. Will continue to monitor.

## 2017-12-16 LAB
BACTERIA UR CULT: NORMAL
SIGNIFICANT IND 70042: NORMAL
SITE SITE: NORMAL
SOURCE SOURCE: NORMAL

## 2018-10-02 DIAGNOSIS — Z01.812 PRE-OPERATIVE LABORATORY EXAMINATION: ICD-10-CM

## 2018-10-02 LAB
ANION GAP SERPL CALC-SCNC: 8 MMOL/L (ref 0–11.9)
BUN SERPL-MCNC: 8 MG/DL (ref 8–22)
CALCIUM SERPL-MCNC: 9.4 MG/DL (ref 8.5–10.5)
CHLORIDE SERPL-SCNC: 104 MMOL/L (ref 96–112)
CO2 SERPL-SCNC: 25 MMOL/L (ref 20–33)
CREAT SERPL-MCNC: 0.64 MG/DL (ref 0.5–1.4)
ERYTHROCYTE [DISTWIDTH] IN BLOOD BY AUTOMATED COUNT: 41.3 FL (ref 35.9–50)
GLUCOSE SERPL-MCNC: 90 MG/DL (ref 65–99)
HCG SERPL QL: NEGATIVE
HCT VFR BLD AUTO: 42.1 % (ref 37–47)
HGB BLD-MCNC: 14.3 G/DL (ref 12–16)
MCH RBC QN AUTO: 29.4 PG (ref 27–33)
MCHC RBC AUTO-ENTMCNC: 34 G/DL (ref 33.6–35)
MCV RBC AUTO: 86.6 FL (ref 81.4–97.8)
PLATELET # BLD AUTO: 237 K/UL (ref 164–446)
PMV BLD AUTO: 10.1 FL (ref 9–12.9)
POTASSIUM SERPL-SCNC: 3.8 MMOL/L (ref 3.6–5.5)
RBC # BLD AUTO: 4.86 M/UL (ref 4.2–5.4)
SODIUM SERPL-SCNC: 137 MMOL/L (ref 135–145)
WBC # BLD AUTO: 8.9 K/UL (ref 4.8–10.8)

## 2018-10-02 PROCEDURE — 36415 COLL VENOUS BLD VENIPUNCTURE: CPT

## 2018-10-02 PROCEDURE — 84703 CHORIONIC GONADOTROPIN ASSAY: CPT

## 2018-10-02 PROCEDURE — 80048 BASIC METABOLIC PNL TOTAL CA: CPT

## 2018-10-02 PROCEDURE — 85027 COMPLETE CBC AUTOMATED: CPT

## 2018-10-02 RX ORDER — CHOLECALCIFEROL (VITAMIN D3) 25 MCG
CAPSULE ORAL DAILY
COMMUNITY

## 2018-10-02 RX ORDER — ALPRAZOLAM 0.5 MG/1
0.5 TABLET ORAL 3 TIMES DAILY PRN
COMMUNITY

## 2018-10-02 RX ORDER — NAPROXEN 500 MG/1
500 TABLET ORAL EVERY 6 HOURS PRN
Status: ON HOLD | COMMUNITY
End: 2018-10-09

## 2018-10-05 NOTE — H&P
HISTORY OF PRESENT ILLNESS:  Patient is a 33-year-old white female  2,   para 2, whose chief complaint is chronic pelvic pain and irregular abnormal   uterine bleeding.  The patient states that she will bleed up to 8 days using 5   pads a day.  She complains of abdominal pain and pelvic pain.  The patient   states that her bleeding has been abnormal for approximately 6 months.  The   patient states that recently she has been bleeding continuously.  The patient   has received Depo-Provera 150 mg injection.  Despite this, she states that she   continues to have abnormal bleeding.    PAST MEDICAL HISTORY:  The patient's past medical history is significant for   pelvic inflammatory disease x2.  The patient states that she has a history of   high risk HPV, but her most recent Pap smear is negative for intraepithelial   lesion and HPV 18/45, RNA is not detected.  The patient's past medical history   is significant for abdominal pain, asthma, anxiety, depression, and obesity.    PAST SURGICAL HISTORY:  Significant for 2  sections, tubal ligation   2015.    ALLERGIES:  No known allergies.    REVIEW OF SYSTEMS:  CONSTITUTIONAL:  The patient complains of sweats and fatigue.  EYES:  She has no change in vision.  EAR, NOSE AND THROAT:  She complains of sore throat and dry mouth.  CARDIOVASCULAR:  She complains of shortness of breath, but no heart   palpitations.  RESPIRATORY:  She complains of coughing, wheezing.  GASTROINTESTINAL:  She complains of abdominal pain, nausea and change in bowel   habits.  GYNECOLOGIC:  She complains of bleeding with intercourse and pelvic pain as   well as continuous vaginal bleeding.  URINARY:  She denies any painful urination, frequent urination, urgent   urination or leaking of urination.  MUSCULOSKELETAL:  She denies any back pain, weakness, joint pain, and joint   stiffness.  INTEGUMENT:  She denies any hair loss, breast lumps, nipple discharge, or   breast  pain.  NEUROLOGICAL:  She complains of anxiety, depression, and numbness and   tingling.  ENDOCRINE:  She complains of night sweats.  She denies any excessive thirst,   excessive urination, tremor, cold or heat intolerance or sleep disturbance.    HABITS:  The patient smokes one-half pack of cigarettes and has done so for 3   years.  She does not partake of alcoholic beverages.  She states she has never   been treated for drug abuse in the past.  She states she does not use   recreational drugs.  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  She states she had been 4 years drug free.    CURRENT MEDICATIONS:  1.  Vitamin D3.  2.  Zoloft 100 mg 1 tab p.o. daily.  3.  Generic Zantac 0.5 mg 1 tab p.o. at bedtime.    PHYSICAL EXAMINATION:  VITAL SIGNS:  Patient is 64 inches tall, 188 pounds, blood pressure 126/78,   pulse 56, respirations 18, O2 sat on room air 58%.  GENERAL:  This is a well-developed female in no apparent distress.  HEENT:  Head normocephalic without sign of trauma.  SKIN:  Reveals acne.  There are multiple skin lesions of the arm and trunk.  NECK:  Supple.  Full range of motion.  Trachea midline.  LUNGS:  Clear to auscultation and percussion.  HEART:  S1, S2 is normal.  No S3, S4.  No lifts, rubs or heaves.  ABDOMEN:  Flat.  Bowel sounds positive.  No hepatomegaly, no splenomegaly.  No   tenderness, guarding, rigidity or rebound.  MUSCULOSKELETAL:  No back pain, joint pain or point tenderness.  PELVIC:  Female escutcheon.  Bartholin, urethral and Llano's glands are   normal.  Urethra, no masses, tenderness, or scarring.  Vaginal estrogen effect   is present.  Bladder, no fullness, masses, or tenderness.  Cystocele absent.    Cervix mild tender to motion.  Uterus 8-10 weeks in size, possible fibroid.    No adnexal mass.  RECTAL:  Normal sphincter tone.    LABORATORY DATA:  WBCs 8.9 K/uL, hemoglobin 14.3 g/dL, hematocrit 42.1.  Basic   metabolic profile within normal limits.  HCG  qualitative negative.    Endometrial biopsy, fragments of proliferative endometrium with significant   decidual stroma suggestive of exogenous consciousness hormone therapy.  No   hyperplasia, polyp or atypia identified.  Transvaginal and transabdominal   ultrasound performed for reasons of heavy period.  Uterus measures 8x4x5 cm.    The endometrium measures 5 mm, no evidence of uterine endometrial mass.  Right   ovary measures 3.6 cm, left ovary measures 2.6 cm.  ____ Interrogation of the   ovary shows expected blood flow with no adnexal mass.  Right ovary is   associated with 2 cm cyst, left ovary revealed a 4 cm cyst.    Transvaginal ultrasound was performed 2018.    IMPRESSION:  1.  Chronic pelvic pain.  2.  Irregular menses, menorrhagia.  3.  Asthma.  4.  History of pelvic inflammatory disease x2.  5.  Caesarean section x2.  6.  Assess pelvic adhesions.  7.  Bilateral ovarian cysts.  8.  Tobacco abuse.    DISCUSSION:  The patient has declined oral contraceptive pills.  She was   treated with Depo-Provera to suppress ovarian cyst.  She was followed for 3   months and continues to bleed abnormally and experienced chronic pelvic pain.    The patient states that she wishes to undergo hysterectomy.  Informed consent   was received.    We had a long and thorough conversation and discussion regarding the risks,   benefits and alternatives to hysterectomy.  We discussed adenomyosis, history   of PID and history of  sections, which may also cause pelvic   adhesions.  We discussed lysis of adhesions.  We discussed some serious and   significant risks to include but not limited to the risk of anesthesia,   infection, bleeding, injury to the bowel, bladder, ureter, or pelvic vessels.    The patient does wish to preserve her ovaries; therefore, I have recommended   laparoscopic-assisted hysterectomy, bilateral salpingectomy, and cystoscopy.    Informed consent was received.  All the patient's questions were  answered to   her satisfaction at my private office.  A copy of the patient's informed   consent form was given to the patient.       ____________________________________     MD DANYA CRAIG / DANYA    DD:  10/05/2018 14:31:37  DT:  10/05/2018 15:50:03    D#:  [id number]  Job#:  [id number]    cc: PAULO TRAYLOR

## 2018-10-06 NOTE — H&P
DATE OF ADMISSION:  10/08/2018    HISTORY OF PRESENT ILLNESS:  Patient is a 33-year-old white female  2,   para 2, whose chief complaint is chronic pelvic pain and irregular abnormal   uterine bleeding.  The patient states that she will bleed up to 8 days using 5   pads a day.  She complains of abdominal pain and pelvic pain.  The patient   states that her bleeding has been abnormal for approximately 6 months.  The   patient states that recently she has been bleeding continuously.  The patient   has received Depo-Provera 150 mg injection.  Despite this, she states that she   continues to have abnormal bleeding.    PAST MEDICAL HISTORY:  The patient's past medical history is significant for   pelvic inflammatory disease x2.  The patient states that she has a history of   high risk HPV, but her most recent Pap smear is negative for intraepithelial   lesion and HPV 18/45, RNA is not detected.  The patient's past medical history   is significant for abdominal pain, asthma, anxiety, depression, and obesity.    PAST SURGICAL HISTORY:  Significant for 2  sections, tubal ligation   2015.    ALLERGIES:  No known allergies.    REVIEW OF SYSTEMS:  CONSTITUTIONAL:  The patient complains of sweats and fatigue.  EYES:  She has no change in vision.  EAR, NOSE AND THROAT:  She complains of sore throat and dry mouth.  CARDIOVASCULAR:  She complains of shortness of breath, but no heart   palpitations.  RESPIRATORY:  She complains of coughing, wheezing.  GASTROINTESTINAL:  She complains of abdominal pain, nausea and change in bowel   habits.  GYNECOLOGIC:  She complains of bleeding with intercourse and pelvic pain as   well as continuous vaginal bleeding.  URINARY:  She denies any painful urination, frequent urination, urgent   urination or leaking of urination.  MUSCULOSKELETAL:  She denies any back pain, weakness, joint pain, and joint   stiffness.  INTEGUMENT:  She denies any hair loss, breast lumps, nipple  discharge, or   breast pain.  NEUROLOGICAL:  She complains of anxiety, depression, and numbness and   tingling.  ENDOCRINE:  She complains of night sweats.  She denies any excessive thirst,   excessive urination, tremor, cold or heat intolerance or sleep disturbance.    HABITS:  The patient smokes one-half pack of cigarettes and has done so for 3   years.  She does not partake of alcoholic beverages.  She states she has never   been treated for drug abuse in the past.  She states she does not use   recreational drugs.  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  She states she had been 4 years drug free.    CURRENT MEDICATIONS:  1.  Vitamin D3.  2.  Zoloft 100 mg 1 tab p.o. daily.  3.  Generic Zantac 0.5 mg 1 tab p.o. at bedtime.    PHYSICAL EXAMINATION:  VITAL SIGNS:  Patient is 64 inches tall, 188 pounds, blood pressure 126/78,   pulse 56, respirations 18, O2 sat on room air 58%.  GENERAL:  This is a well-developed female in no apparent distress.  HEENT:  Head normocephalic without sign of trauma.  SKIN:  Reveals acne.  There are multiple skin lesions of the arm and trunk.  NECK:  Supple.  Full range of motion.  Trachea midline.  LUNGS:  Clear to auscultation and percussion.  HEART:  S1, S2 is normal.  No S3, S4.  No lifts, rubs or heaves.  ABDOMEN:  Flat.  Bowel sounds positive.  No hepatomegaly, no splenomegaly.  No   tenderness, guarding, rigidity or rebound.  MUSCULOSKELETAL:  No back pain, joint pain or point tenderness.  PELVIC:  Female escutcheon.  Bartholin, urethral and Bibo's glands are   normal.  Urethra, no masses, tenderness, or scarring.  Vaginal estrogen effect   is present.  Bladder, no fullness, masses, or tenderness.  Cystocele absent.    Cervix mild tender to motion.  Uterus 8-10 weeks in size, possible fibroid.    No adnexal mass.  RECTAL:  Normal sphincter tone.    LABORATORY DATA:  WBCs 8.9 K/uL, hemoglobin 14.3 g/dL, hematocrit 42.1.  Basic   metabolic profile  within normal limits.  HCG qualitative negative.    Endometrial biopsy, fragments of proliferative endometrium with significant   decidual stroma suggestive of exogenous consciousness hormone therapy.  No   hyperplasia, polyp or atypia identified.  Transvaginal and transabdominal   ultrasound performed for reasons of heavy period.  Uterus measures 8x4x5 cm.    The endometrium measures 5 mm, no evidence of uterine endometrial mass.  Right   ovary measures 3.6 cm, left ovary measures 2.6 cm.  ____ Interrogation of the   ovary shows expected blood flow with no adnexal mass.  Right ovary is   associated with 2 cm cyst, left ovary revealed a 4 cm cyst.    Transvaginal ultrasound was performed 2018.    IMPRESSION:  1.  Chronic pelvic pain.  2.  Irregular menses, menorrhagia.  3.  Asthma.  4.  History of pelvic inflammatory disease x2.  5.  Caesarean section x2.  6.  Assess pelvic adhesions.  7.  Bilateral ovarian cysts.  8.  Tobacco abuse.    DISCUSSION:  The patient has declined oral contraceptive pills.  She was   treated with Depo-Provera to suppress ovarian cyst.  She was followed for 3   months and continues to bleed abnormally and experienced chronic pelvic pain.    The patient states that she wishes to undergo hysterectomy.  Informed consent   was received.    We had a long and thorough conversation and discussion regarding the risks,   benefits and alternatives to hysterectomy.  We discussed adenomyosis, history   of PID and history of  sections, which may also cause pelvic   adhesions.  We discussed lysis of adhesions.  We discussed some serious and   significant risks to include but not limited to the risk of anesthesia,   infection, bleeding, injury to the bowel, bladder, ureter, or pelvic vessels.    The patient does wish to preserve her ovaries; therefore, I have recommended   laparoscopic-assisted hysterectomy, bilateral salpingectomy, and cystoscopy.    Informed consent was received.  All the  patient's questions were answered to   her satisfaction at my private office.  A copy of the patient's informed   consent form was given to the patient.       ____________________________________     MD DANYA CRAIG / DANYA    DD:  10/05/2018 14:31:37  DT:  10/05/2018 15:50:03    D#:  [id number]  Job#:  [id number]    cc: PAULO TRAYLOR

## 2018-10-08 ENCOUNTER — HOSPITAL ENCOUNTER (INPATIENT)
Facility: MEDICAL CENTER | Age: 33
LOS: 1 days | DRG: 743 | End: 2018-10-09
Attending: OBSTETRICS & GYNECOLOGY | Admitting: OBSTETRICS & GYNECOLOGY
Payer: MEDICAID

## 2018-10-08 LAB
B-HCG FREE SERPL-ACNC: <5 MIU/ML
IHCGL IHCGL: NEGATIVE MIU/ML

## 2018-10-08 PROCEDURE — 160029 HCHG SURGERY MINUTES - 1ST 30 MINS LEVEL 4: Performed by: OBSTETRICS & GYNECOLOGY

## 2018-10-08 PROCEDURE — 700102 HCHG RX REV CODE 250 W/ 637 OVERRIDE(OP): Performed by: ANESTHESIOLOGY

## 2018-10-08 PROCEDURE — 500886 HCHG PACK, LAPAROSCOPY: Performed by: OBSTETRICS & GYNECOLOGY

## 2018-10-08 PROCEDURE — 0UT7FZZ RESECTION OF BILATERAL FALLOPIAN TUBES, VIA NATURAL OR ARTIFICIAL OPENING WITH PERCUTANEOUS ENDOSCOPIC ASSISTANCE: ICD-10-PCS | Performed by: OBSTETRICS & GYNECOLOGY

## 2018-10-08 PROCEDURE — 88307 TISSUE EXAM BY PATHOLOGIST: CPT

## 2018-10-08 PROCEDURE — 160002 HCHG RECOVERY MINUTES (STAT): Performed by: OBSTETRICS & GYNECOLOGY

## 2018-10-08 PROCEDURE — 501577 HCHG TROCAR, STEP 11MM: Performed by: OBSTETRICS & GYNECOLOGY

## 2018-10-08 PROCEDURE — 0UT9FZZ RESECTION OF UTERUS, VIA NATURAL OR ARTIFICIAL OPENING WITH PERCUTANEOUS ENDOSCOPIC ASSISTANCE: ICD-10-PCS | Performed by: OBSTETRICS & GYNECOLOGY

## 2018-10-08 PROCEDURE — 160035 HCHG PACU - 1ST 60 MINS PHASE I: Performed by: OBSTETRICS & GYNECOLOGY

## 2018-10-08 PROCEDURE — 501411 HCHG SPONGE, BABY LAP W/O RINGS: Performed by: OBSTETRICS & GYNECOLOGY

## 2018-10-08 PROCEDURE — 700111 HCHG RX REV CODE 636 W/ 250 OVERRIDE (IP)

## 2018-10-08 PROCEDURE — 0TJB8ZZ INSPECTION OF BLADDER, VIA NATURAL OR ARTIFICIAL OPENING ENDOSCOPIC: ICD-10-PCS | Performed by: OBSTETRICS & GYNECOLOGY

## 2018-10-08 PROCEDURE — 160036 HCHG PACU - EA ADDL 30 MINS PHASE I: Performed by: OBSTETRICS & GYNECOLOGY

## 2018-10-08 PROCEDURE — 700104 HCHG RX REV CODE 254

## 2018-10-08 PROCEDURE — 501838 HCHG SUTURE GENERAL: Performed by: OBSTETRICS & GYNECOLOGY

## 2018-10-08 PROCEDURE — 160009 HCHG ANES TIME/MIN: Performed by: OBSTETRICS & GYNECOLOGY

## 2018-10-08 PROCEDURE — 160048 HCHG OR STATISTICAL LEVEL 1-5: Performed by: OBSTETRICS & GYNECOLOGY

## 2018-10-08 PROCEDURE — A4338 INDWELLING CATHETER LATEX: HCPCS | Performed by: OBSTETRICS & GYNECOLOGY

## 2018-10-08 PROCEDURE — A9270 NON-COVERED ITEM OR SERVICE: HCPCS | Performed by: OBSTETRICS & GYNECOLOGY

## 2018-10-08 PROCEDURE — 700101 HCHG RX REV CODE 250

## 2018-10-08 PROCEDURE — A9270 NON-COVERED ITEM OR SERVICE: HCPCS | Performed by: ANESTHESIOLOGY

## 2018-10-08 PROCEDURE — 770006 HCHG ROOM/CARE - MED/SURG/GYN SEMI*

## 2018-10-08 PROCEDURE — 160041 HCHG SURGERY MINUTES - EA ADDL 1 MIN LEVEL 4: Performed by: OBSTETRICS & GYNECOLOGY

## 2018-10-08 PROCEDURE — 500901 HCHG PACKING, VAG 2 X-RAY: Performed by: OBSTETRICS & GYNECOLOGY

## 2018-10-08 PROCEDURE — 500854 HCHG NEEDLE, INSUFFLATION FOR STEP: Performed by: OBSTETRICS & GYNECOLOGY

## 2018-10-08 PROCEDURE — 700102 HCHG RX REV CODE 250 W/ 637 OVERRIDE(OP): Performed by: OBSTETRICS & GYNECOLOGY

## 2018-10-08 PROCEDURE — 501579 HCHG TROCAR, STEP 5MM: Performed by: OBSTETRICS & GYNECOLOGY

## 2018-10-08 PROCEDURE — 84702 CHORIONIC GONADOTROPIN TEST: CPT

## 2018-10-08 PROCEDURE — 502704 HCHG DEVICE, LIGASURE IMPACT: Performed by: OBSTETRICS & GYNECOLOGY

## 2018-10-08 PROCEDURE — 700105 HCHG RX REV CODE 258: Performed by: OBSTETRICS & GYNECOLOGY

## 2018-10-08 PROCEDURE — 501330 HCHG SET, CYSTO IRRIG TUBING: Performed by: OBSTETRICS & GYNECOLOGY

## 2018-10-08 PROCEDURE — 700111 HCHG RX REV CODE 636 W/ 250 OVERRIDE (IP): Performed by: ANESTHESIOLOGY

## 2018-10-08 RX ORDER — OXYCODONE HYDROCHLORIDE 5 MG/1
5 TABLET ORAL
Status: DISCONTINUED | OUTPATIENT
Start: 2018-10-08 | End: 2018-10-08 | Stop reason: HOSPADM

## 2018-10-08 RX ORDER — ACETAMINOPHEN 500 MG
1000 TABLET ORAL ONCE
Status: COMPLETED | OUTPATIENT
Start: 2018-10-08 | End: 2018-10-08

## 2018-10-08 RX ORDER — HALOPERIDOL 5 MG/ML
1 INJECTION INTRAMUSCULAR
Status: DISCONTINUED | OUTPATIENT
Start: 2018-10-08 | End: 2018-10-08 | Stop reason: HOSPADM

## 2018-10-08 RX ORDER — CELECOXIB 200 MG/1
400 CAPSULE ORAL ONCE
Status: COMPLETED | OUTPATIENT
Start: 2018-10-08 | End: 2018-10-08

## 2018-10-08 RX ORDER — OXYCODONE HYDROCHLORIDE 5 MG/1
10 TABLET ORAL
Status: DISCONTINUED | OUTPATIENT
Start: 2018-10-08 | End: 2018-10-09 | Stop reason: HOSPADM

## 2018-10-08 RX ORDER — SODIUM CHLORIDE, SODIUM LACTATE, POTASSIUM CHLORIDE, AND CALCIUM CHLORIDE .6; .31; .03; .02 G/100ML; G/100ML; G/100ML; G/100ML
500 INJECTION, SOLUTION INTRAVENOUS ONCE
Status: DISCONTINUED | OUTPATIENT
Start: 2018-10-08 | End: 2018-10-08 | Stop reason: HOSPADM

## 2018-10-08 RX ORDER — BUPIVACAINE HYDROCHLORIDE AND EPINEPHRINE 2.5; 5 MG/ML; UG/ML
INJECTION, SOLUTION EPIDURAL; INFILTRATION; INTRACAUDAL; PERINEURAL
Status: DISCONTINUED | OUTPATIENT
Start: 2018-10-08 | End: 2018-10-08 | Stop reason: HOSPADM

## 2018-10-08 RX ORDER — ALPRAZOLAM 0.25 MG/1
0.5 TABLET ORAL NIGHTLY PRN
Status: DISCONTINUED | OUTPATIENT
Start: 2018-10-08 | End: 2018-10-09 | Stop reason: HOSPADM

## 2018-10-08 RX ORDER — OXYCODONE HCL 5 MG/5 ML
10 SOLUTION, ORAL ORAL
Status: COMPLETED | OUTPATIENT
Start: 2018-10-08 | End: 2018-10-08

## 2018-10-08 RX ORDER — OXYCODONE HYDROCHLORIDE 5 MG/1
10 TABLET ORAL
Status: DISCONTINUED | OUTPATIENT
Start: 2018-10-08 | End: 2018-10-08 | Stop reason: HOSPADM

## 2018-10-08 RX ORDER — SODIUM CHLORIDE, SODIUM LACTATE, POTASSIUM CHLORIDE, CALCIUM CHLORIDE 600; 310; 30; 20 MG/100ML; MG/100ML; MG/100ML; MG/100ML
INJECTION, SOLUTION INTRAVENOUS CONTINUOUS
Status: ACTIVE | OUTPATIENT
Start: 2018-10-08 | End: 2018-10-08

## 2018-10-08 RX ORDER — ONDANSETRON 2 MG/ML
4 INJECTION INTRAMUSCULAR; INTRAVENOUS
Status: DISCONTINUED | OUTPATIENT
Start: 2018-10-08 | End: 2018-10-08 | Stop reason: HOSPADM

## 2018-10-08 RX ORDER — SIMETHICONE 80 MG
80 TABLET,CHEWABLE ORAL EVERY 8 HOURS PRN
Status: DISCONTINUED | OUTPATIENT
Start: 2018-10-08 | End: 2018-10-09 | Stop reason: HOSPADM

## 2018-10-08 RX ORDER — HYDROMORPHONE HYDROCHLORIDE 2 MG/ML
0.2 INJECTION, SOLUTION INTRAMUSCULAR; INTRAVENOUS; SUBCUTANEOUS
Status: DISCONTINUED | OUTPATIENT
Start: 2018-10-08 | End: 2018-10-08 | Stop reason: HOSPADM

## 2018-10-08 RX ORDER — OXYCODONE HYDROCHLORIDE 5 MG/1
5 TABLET ORAL
Status: DISCONTINUED | OUTPATIENT
Start: 2018-10-08 | End: 2018-10-09 | Stop reason: HOSPADM

## 2018-10-08 RX ORDER — SCOLOPAMINE TRANSDERMAL SYSTEM 1 MG/1
1 PATCH, EXTENDED RELEASE TRANSDERMAL ONCE
Status: DISCONTINUED | OUTPATIENT
Start: 2018-10-08 | End: 2018-10-09 | Stop reason: HOSPADM

## 2018-10-08 RX ORDER — HYDROMORPHONE HYDROCHLORIDE 2 MG/ML
0.4 INJECTION, SOLUTION INTRAMUSCULAR; INTRAVENOUS; SUBCUTANEOUS
Status: DISCONTINUED | OUTPATIENT
Start: 2018-10-08 | End: 2018-10-08 | Stop reason: HOSPADM

## 2018-10-08 RX ORDER — SODIUM CHLORIDE, SODIUM LACTATE, POTASSIUM CHLORIDE, CALCIUM CHLORIDE 600; 310; 30; 20 MG/100ML; MG/100ML; MG/100ML; MG/100ML
INJECTION, SOLUTION INTRAVENOUS CONTINUOUS
Status: DISCONTINUED | OUTPATIENT
Start: 2018-10-08 | End: 2018-10-09 | Stop reason: HOSPADM

## 2018-10-08 RX ORDER — MORPHINE SULFATE 4 MG/ML
4 INJECTION, SOLUTION INTRAMUSCULAR; INTRAVENOUS
Status: DISCONTINUED | OUTPATIENT
Start: 2018-10-08 | End: 2018-10-09 | Stop reason: HOSPADM

## 2018-10-08 RX ORDER — HYDROMORPHONE HYDROCHLORIDE 2 MG/ML
0.1 INJECTION, SOLUTION INTRAMUSCULAR; INTRAVENOUS; SUBCUTANEOUS
Status: DISCONTINUED | OUTPATIENT
Start: 2018-10-08 | End: 2018-10-08 | Stop reason: HOSPADM

## 2018-10-08 RX ORDER — ONDANSETRON 2 MG/ML
4 INJECTION INTRAMUSCULAR; INTRAVENOUS EVERY 4 HOURS PRN
Status: DISCONTINUED | OUTPATIENT
Start: 2018-10-08 | End: 2018-10-09 | Stop reason: HOSPADM

## 2018-10-08 RX ORDER — SERTRALINE HYDROCHLORIDE 100 MG/1
100 TABLET, FILM COATED ORAL EVERY EVENING
Status: DISCONTINUED | OUTPATIENT
Start: 2018-10-08 | End: 2018-10-09 | Stop reason: HOSPADM

## 2018-10-08 RX ORDER — OXYCODONE HCL 5 MG/5 ML
5 SOLUTION, ORAL ORAL
Status: COMPLETED | OUTPATIENT
Start: 2018-10-08 | End: 2018-10-08

## 2018-10-08 RX ORDER — MEPERIDINE HYDROCHLORIDE 25 MG/ML
12.5 INJECTION INTRAMUSCULAR; INTRAVENOUS; SUBCUTANEOUS
Status: DISCONTINUED | OUTPATIENT
Start: 2018-10-08 | End: 2018-10-08 | Stop reason: HOSPADM

## 2018-10-08 RX ADMIN — ACETAMINOPHEN 1000 MG: 500 TABLET, FILM COATED ORAL at 11:15

## 2018-10-08 RX ADMIN — SODIUM CHLORIDE, POTASSIUM CHLORIDE, SODIUM LACTATE AND CALCIUM CHLORIDE: 600; 310; 30; 20 INJECTION, SOLUTION INTRAVENOUS at 18:19

## 2018-10-08 RX ADMIN — OXYCODONE HYDROCHLORIDE 10 MG: 5 TABLET ORAL at 18:19

## 2018-10-08 RX ADMIN — FENTANYL CITRATE 25 MCG: 50 INJECTION, SOLUTION INTRAMUSCULAR; INTRAVENOUS at 15:21

## 2018-10-08 RX ADMIN — FENTANYL CITRATE 25 MCG: 50 INJECTION, SOLUTION INTRAMUSCULAR; INTRAVENOUS at 15:10

## 2018-10-08 RX ADMIN — SERTRALINE 100 MG: 100 TABLET, FILM COATED ORAL at 18:19

## 2018-10-08 RX ADMIN — SCOPOLAMINE 1 PATCH: 1 PATCH, EXTENDED RELEASE TRANSDERMAL at 11:16

## 2018-10-08 RX ADMIN — OXYCODONE HYDROCHLORIDE 5 MG: 5 TABLET ORAL at 23:18

## 2018-10-08 RX ADMIN — OXYCODONE HYDROCHLORIDE 10 MG: 5 SOLUTION ORAL at 16:03

## 2018-10-08 RX ADMIN — CELECOXIB 400 MG: 200 CAPSULE ORAL at 11:15

## 2018-10-08 RX ADMIN — SODIUM CHLORIDE, SODIUM LACTATE, POTASSIUM CHLORIDE, CALCIUM CHLORIDE 1000 ML: 600; 310; 30; 20 INJECTION, SOLUTION INTRAVENOUS at 11:20

## 2018-10-08 ASSESSMENT — COGNITIVE AND FUNCTIONAL STATUS - GENERAL
SUGGESTED CMS G CODE MODIFIER DAILY ACTIVITY: CH
DAILY ACTIVITIY SCORE: 24
MOBILITY SCORE: 24
SUGGESTED CMS G CODE MODIFIER MOBILITY: CH

## 2018-10-08 ASSESSMENT — PAIN SCALES - GENERAL
PAINLEVEL_OUTOF10: 8
PAINLEVEL_OUTOF10: 5
PAINLEVEL_OUTOF10: 0
PAINLEVEL_OUTOF10: 5
PAINLEVEL_OUTOF10: 3
PAINLEVEL_OUTOF10: 7

## 2018-10-08 ASSESSMENT — LIFESTYLE VARIABLES
EVER_SMOKED: YES
ALCOHOL_USE: NO

## 2018-10-08 ASSESSMENT — PATIENT HEALTH QUESTIONNAIRE - PHQ9
2. FEELING DOWN, DEPRESSED, IRRITABLE, OR HOPELESS: NOT AT ALL
1. LITTLE INTEREST OR PLEASURE IN DOING THINGS: NOT AT ALL
SUM OF ALL RESPONSES TO PHQ9 QUESTIONS 1 AND 2: 0

## 2018-10-08 NOTE — OR NURSING
REPORT FROM MANUELITO DELVALLE.  PATIENT SLEEPY  VSS  NO CHANGE IN ASSESSMENT.  1600 VERY SLEEPY  C/O PAIN 7/10.  GIVEN ORAL PAIN MEDICATION AND MART CATHETER DC'D PER ORDER.  GOWN CHANGED.  MINIMAL BLEEDING NOTED.  PAD AND UNDERWEAR PLACED.    1630 RESTING QUIETLY.  REPORT TO MIKHAIL DELVALLE.  1700 MORE AWAKE.  TOLERATING PO FLUIDS.  WAITING FOR TRANSPORT.    1725 TRANSPORT HERE.  BELONGINGS ON [facility].  PATIENT READY FOR TRANSFER.

## 2018-10-08 NOTE — OR SURGEON
Immediate Post OP Note    PreOp Diagnosis: Chronic pelvic pain, adenomyosis, pelvic adhesions, fibroid uterus    PostOp Diagnosis: Chronic pelvic pain, Pathology pending, left hydrosalpinx  Procedure(s):  VAGINAL HYSTERECTOMY SCOPE TOTAL - Wound Class: Clean Contaminated  SALPINGECTOMY - Wound Class: Clean Contaminated  CYSTOSCOPY - Wound Class: Clean Contaminated    Surgeon(s):  JOSE GUADALUPE Sesay M.D.    Anesthesiologist/Type of Anesthesia:  Anesthesiologist: Jazzy Delacruz M.D./General    Surgical Staff:  Circulator: Becka Lee R.N.  Scrub Person: Inocencia Zabala    Specimens removed if any:  Uterus and bilateral fallopian tubes    Estimated Blood Loss: 200 ml    Findings: Enlarged uterus, bilateral tubal ligation, left hydrosalpinx, no pelvic adhesions, scarred uterine incision from previous C - Section X 2    Complications: None        10/8/2018 2:33 PM Johnathan Nesbitt M.D.

## 2018-10-08 NOTE — OR NURSING
1440 Pt arrived from OR with Dr. Delacruz.  Pt VSS, PIV infusing without issue. Mask in use.  Pt still very sleepy.  2 trochar site, CDI.  Vaginal packing in place per OR ADELIA.  Nereida to d/d.    1500 Clarified with MD when to remove vaginal packing.  Per MD, leave in until morning, but if pt very uncomfortable, may remove beforehand.   1510 Pt complaints of 7/10 pain, medicated with IV Fentanyl.   1515  brought to bedside.   1521 Pt medicated again with IV fentanyl.   1527 Report to Fernando DELVALLE.

## 2018-10-08 NOTE — OP REPORT
DATE OF SERVICE:  10/08/2018    PREOPERATIVE DIAGNOSES:  Chronic pelvic pain, assess pelvic endometriosis,   pelvic adhesions.    POSTOPERATIVE DIAGNOSES:  Chronic pelvic pain, pelvic adhesions of the lower   uterine segment, pathology pending, left hydrosalpinx.    OPERATIONS PERFORMED:  Laparoscopic-assisted vaginal hysterectomy, bilateral   salpingectomy, and cystoscopy.    :  Johnathan Nesbitt MD    ASSISTANT:  Jen Tomas MD    ANESTHESIOLOGIST:  Jazzy Delacruz MD    ANESTHESIA:  General endotracheal.    ANTIBIOTICS:  Ancef 2 g IV piggyback prior to the operation.    DRAINS:  Padron catheter in place.    PROPHYLAXIS:  Sequential stockings in place.    FINDINGS:  Exam under anesthesia revealed the uterus to be approximately 9-10   cm in size.  The lower uterine segment was scarred.  The patient had undergone   2 C-sections.  There were no pelvic or abdominal adhesions.  There was no   sign of pelvic endometriosis.  The right upper quadrant was without gross   pathology.    DESCRIPTION OF OPERATION:  The patient was taken to the operating room and   placed on the operating room table where general endotracheal anesthesia was   administered.  The patient was placed in a modified dorsal lithotomy position,   prepped and draped in the usual fashion.  Sequential stockings were in place.    The patient had received Ancef 2 g IV piggyback.  A time-out was called; the   patient, the operation, her birthdate, and allergies were identified and   confirmed.  Following the time-out, a Padron catheter had been put in place.    Exam under anesthesia was performed with the uterus approximately 8-10 weeks   in size, smooth, mobile with no adnexal masses.  A weighted speculum was   placed in the vagina.  The anterior lip of the cervix was grasped with a   single tooth tenaculum.  The cervix was dilated using Lino dilators.  The   uterus was sounded to 9 cm.  A #8 Kristi uterine manipulator was placed.  The   weighted  speculum was removed as was the single tooth tenaculum and the legs   were lowered.  The subumbilical region was injected with 0.25% Marcaine with   epinephrine.  A linear 10 mm incision was made.  Through this incision, a   Veress step needle was introduced and the drop test was positive.  The   peritoneal cavity was insufflated with 3.2 liters of CO2 and the Veress needle   was removed.  A #11 step trocar was introduced.  Diagnostic laparoscopy was   performed.  5 cm above the symphysis pubis in the midline, 0.25% Marcaine was   injected.  A 5 mm transverse incision was made.  Through this transverse   incision, a step Veress needle was inserted.  The needle was removed and a #5   laparoscopic step trocar was introduced.  Through the trocar, a Prestige   instrument was placed.  Diagnostic laparoscopy was performed.  The right   fallopian tube was elevated.  The right ovary was without gross pathology.    The right ureter was visualized.  The mesosalpinx of the right fallopian tube   was cauterized and left intact.  The uterine ovarian ligament was grasped and   lysed.  Hugging the corpus of the uterus, tissue was grasped and lysed to the   level below the right uterine vessels.  Attention was then focused on the left   adnexa.  There was a large hydrosalpinx.  The mesosalpinx was cauterized and   lysed.  The left hydrosalpinx was approximately 3-4 cm in size.  The proximal   fallopian tube was cauterized and lysed, and the hydrosalpinx was removed and   placed in the cul-de-sac.  The left uterine ovarian ligament was cauterized   and lysed.  Hugging the uterus, tissue was cauterized and lysed to the level   below the uterine vessels.  The bladder flap was then developed.  Placing a   Prestige instrument on the peritoneum, the peritoneum was elevated and using   sharp scissors, the serosa of the uterus was lysed and the bladder flap was   advanced using sharp and blunt dissection.  Peritoneal cavity was irrigated    using the Nezhat  aspirator.  There was good hemostasis.  Under   direct vision, the Nezhat was removed.  All instruments were removed and   draped across the abdomen with a sterile towel.  The legs were elevated.    Weighted speculum was placed in the vagina.  The anterior and posterior lip of   the cervix was grasped with Sheng tenaculums.  The cervix was injected with   0.25% Marcaine with epinephrine, a total of 10 mL.  Using a Bard Anil #10   blade, the cervix was circumscribed.  The vagina was advanced anteriorly and   posteriorly.  The posterior cul-de-sac was entered using sharp dissection with   Roy scissors.  A single suture of 2-0 Vicryl was placed in the 6 o'clock   position of the posterior vagina.  A long weighted Taye-Auvard weighted   speculum was placed.  A curved George clamp was placed across the left   uterosacral ligament.  This tissue was incised and ligated with 2-0 Vicryl;   this suture was kept long.  The right uterosacral ligament was grasped with a   curved George clamp.  This tissue was divided and ligated with 0 Vicryl; this   suture was kept long.  The left cardinal uterosacral ligament complex was   grasped, divided and ligated with 0 Vicryl, this suture was cut short.  The   same procedure was carried out on the right side.  Grasping peritoneum on the   left anteriorly and posteriorly, tissue was grasped, divided and ligated with   0 Vicryl.  The same procedure was carried out on the right side.  On the   patient's left side, the remaining tissue was grasped, divided and ligated   with 0 Vicryl.  On the right side, tissue was grasped, divided and ligated   with 0 Vicryl.  A small aspect of tissue remained and this was grasped with a   curved George clamp, divided and doubly ligated with free ties.  The uterus   and cervix was removed along with the right fallopian tube.  The remaining   fallopian tube, which was in the cul-de-sac, had been grasped and delivered   into  the operative field.  All tissue was sent combined.  Using 0 Vicryl on a   swaged-on needle, running right to left, the pubocervical, rectovaginal   fascia, and vagina was closed in a running suture, suturing across in a   transverse manner.  Sponge stick was placed in the vagina.  Diagnostic   laparoscopy was performed.  Separate bleeders were coagulated with the   LigaSure cautery.  The LigaSure cautery was used throughout the procedure at a   setting of 3 bars.  Vicki 5 mg powder was placed in the posterior   cul-de-sac.  There was good hemostasis.  Under direct vision, the 5 mm trocar   was removed.  CO2 was allowed to escape from the peritoneal cavity.  Under   direct vision, the 11 mm trocar was removed.  The rectus fascia was   approximated with 2-0 Vicryl in a single suture.  The subumbilical incision   was closed with 4-0 Vicryl.  The suprapubic incision was closed with 4-0   Vicryl.  A cystoscopy was then performed.  The Padron catheter, which had been   placed, was removed using a 70-degree scope.  Urine was seen to jet from the   right ureteral orifice and the left ureteral orifice on 2 occasions.  Indigo   carmine had been given, but since there was good jetting of urine, a   cystoscopy was completed.  There was no gross pathology of the bladder.  There   appeared to be some previous inflammation and metaplasia.  The Padron catheter   was replaced.  Needle and pad count were correct.  The patient was taken to   recovery room in satisfactory condition.  Prior to the operation in my private   office, we discussed the risks, benefits, and alternatives to surgery with   some serious and significant risks included, but not limited to the risk of   anesthesia, infection, bleeding, injury to the bowel, bladder, ureter, pelvic   vessels, thrombophlebitis, deep vein thrombosis, pulmonary emboli, and even   death.  Informed consent was received.  Informed consent form was read and   signed after a thorough  discussion of the procedure.  The procedure had been   described in detail to the patient.  Informed consent form had been given to   the patient.  Prior to the operation, we confirmed that the patient wished to   have her uterus, cervix, and fallopian tubes removed with preservation of her   ovaries.  This was performed in the presence of an RN.       ____________________________________     MD DANYA CRAIG / DANYA    DD:  10/08/2018 14:55:54  DT:  10/08/2018 16:30:17    D#:  [id number]  Job#:  [id number]    cc: Jen Tomas MD

## 2018-10-09 ENCOUNTER — APPOINTMENT (OUTPATIENT)
Dept: RADIOLOGY | Facility: MEDICAL CENTER | Age: 33
DRG: 743 | End: 2018-10-09
Attending: OBSTETRICS & GYNECOLOGY
Payer: MEDICAID

## 2018-10-09 VITALS
BODY MASS INDEX: 34.3 KG/M2 | TEMPERATURE: 98.8 F | SYSTOLIC BLOOD PRESSURE: 118 MMHG | OXYGEN SATURATION: 98 % | DIASTOLIC BLOOD PRESSURE: 67 MMHG | WEIGHT: 193.56 LBS | RESPIRATION RATE: 18 BRPM | HEIGHT: 63 IN | HEART RATE: 81 BPM

## 2018-10-09 LAB
ALBUMIN SERPL BCP-MCNC: 4 G/DL (ref 3.2–4.9)
ALBUMIN/GLOB SERPL: 1.3 G/DL
ALP SERPL-CCNC: 45 U/L (ref 30–99)
ALT SERPL-CCNC: 12 U/L (ref 2–50)
ANION GAP SERPL CALC-SCNC: 10 MMOL/L (ref 0–11.9)
AST SERPL-CCNC: 11 U/L (ref 12–45)
BASOPHILS # BLD AUTO: 0.1 % (ref 0–1.8)
BASOPHILS # BLD: 0.01 K/UL (ref 0–0.12)
BILIRUB SERPL-MCNC: 0.6 MG/DL (ref 0.1–1.5)
BUN SERPL-MCNC: 14 MG/DL (ref 8–22)
CALCIUM SERPL-MCNC: 9.2 MG/DL (ref 8.5–10.5)
CHLORIDE SERPL-SCNC: 103 MMOL/L (ref 96–112)
CO2 SERPL-SCNC: 22 MMOL/L (ref 20–33)
CREAT SERPL-MCNC: 0.6 MG/DL (ref 0.5–1.4)
EKG IMPRESSION: NORMAL
EOSINOPHIL # BLD AUTO: 0.01 K/UL (ref 0–0.51)
EOSINOPHIL NFR BLD: 0.1 % (ref 0–6.9)
ERYTHROCYTE [DISTWIDTH] IN BLOOD BY AUTOMATED COUNT: 41 FL (ref 35.9–50)
GLOBULIN SER CALC-MCNC: 3 G/DL (ref 1.9–3.5)
GLUCOSE SERPL-MCNC: 120 MG/DL (ref 65–99)
HCT VFR BLD AUTO: 38.6 % (ref 37–47)
HGB BLD-MCNC: 13.4 G/DL (ref 12–16)
IMM GRANULOCYTES # BLD AUTO: 0.1 K/UL (ref 0–0.11)
IMM GRANULOCYTES NFR BLD AUTO: 0.6 % (ref 0–0.9)
LYMPHOCYTES # BLD AUTO: 1.57 K/UL (ref 1–4.8)
LYMPHOCYTES NFR BLD: 9.9 % (ref 22–41)
MCH RBC QN AUTO: 29.6 PG (ref 27–33)
MCHC RBC AUTO-ENTMCNC: 34.7 G/DL (ref 33.6–35)
MCV RBC AUTO: 85.2 FL (ref 81.4–97.8)
MONOCYTES # BLD AUTO: 0.79 K/UL (ref 0–0.85)
MONOCYTES NFR BLD AUTO: 5 % (ref 0–13.4)
NEUTROPHILS # BLD AUTO: 13.41 K/UL (ref 2–7.15)
NEUTROPHILS NFR BLD: 84.3 % (ref 44–72)
NRBC # BLD AUTO: 0 K/UL
NRBC BLD-RTO: 0 /100 WBC
PLATELET # BLD AUTO: 272 K/UL (ref 164–446)
PMV BLD AUTO: 9.7 FL (ref 9–12.9)
POTASSIUM SERPL-SCNC: 4 MMOL/L (ref 3.6–5.5)
PROT SERPL-MCNC: 7 G/DL (ref 6–8.2)
RBC # BLD AUTO: 4.53 M/UL (ref 4.2–5.4)
SODIUM SERPL-SCNC: 135 MMOL/L (ref 135–145)
TROPONIN I SERPL-MCNC: <0.01 NG/ML (ref 0–0.04)
WBC # BLD AUTO: 15.9 K/UL (ref 4.8–10.8)

## 2018-10-09 PROCEDURE — 93005 ELECTROCARDIOGRAM TRACING: CPT | Performed by: OBSTETRICS & GYNECOLOGY

## 2018-10-09 PROCEDURE — 84484 ASSAY OF TROPONIN QUANT: CPT

## 2018-10-09 PROCEDURE — 80053 COMPREHEN METABOLIC PANEL: CPT

## 2018-10-09 PROCEDURE — 36415 COLL VENOUS BLD VENIPUNCTURE: CPT

## 2018-10-09 PROCEDURE — 71045 X-RAY EXAM CHEST 1 VIEW: CPT

## 2018-10-09 PROCEDURE — 700111 HCHG RX REV CODE 636 W/ 250 OVERRIDE (IP): Performed by: OBSTETRICS & GYNECOLOGY

## 2018-10-09 PROCEDURE — 93010 ELECTROCARDIOGRAM REPORT: CPT | Performed by: INTERNAL MEDICINE

## 2018-10-09 PROCEDURE — A9270 NON-COVERED ITEM OR SERVICE: HCPCS | Performed by: OBSTETRICS & GYNECOLOGY

## 2018-10-09 PROCEDURE — 85025 COMPLETE CBC W/AUTO DIFF WBC: CPT

## 2018-10-09 PROCEDURE — 700105 HCHG RX REV CODE 258: Performed by: OBSTETRICS & GYNECOLOGY

## 2018-10-09 PROCEDURE — 700102 HCHG RX REV CODE 250 W/ 637 OVERRIDE(OP): Performed by: OBSTETRICS & GYNECOLOGY

## 2018-10-09 RX ADMIN — MORPHINE SULFATE 4 MG: 4 INJECTION INTRAVENOUS at 05:15

## 2018-10-09 RX ADMIN — OXYCODONE HYDROCHLORIDE 5 MG: 5 TABLET ORAL at 02:20

## 2018-10-09 RX ADMIN — OXYCODONE HYDROCHLORIDE 10 MG: 5 TABLET ORAL at 08:33

## 2018-10-09 RX ADMIN — ALPRAZOLAM 0.5 MG: 0.25 TABLET ORAL at 05:00

## 2018-10-09 RX ADMIN — ONDANSETRON 4 MG: 2 INJECTION INTRAMUSCULAR; INTRAVENOUS at 02:31

## 2018-10-09 RX ADMIN — OXYCODONE HYDROCHLORIDE 10 MG: 5 TABLET ORAL at 14:07

## 2018-10-09 RX ADMIN — SODIUM CHLORIDE, POTASSIUM CHLORIDE, SODIUM LACTATE AND CALCIUM CHLORIDE: 600; 310; 30; 20 INJECTION, SOLUTION INTRAVENOUS at 06:18

## 2018-10-09 ASSESSMENT — PAIN SCALES - GENERAL
PAINLEVEL_OUTOF10: 5
PAINLEVEL_OUTOF10: 7
PAINLEVEL_OUTOF10: 4
PAINLEVEL_OUTOF10: 8
PAINLEVEL_OUTOF10: 6
PAINLEVEL_OUTOF10: 5
PAINLEVEL_OUTOF10: 7

## 2018-10-09 NOTE — PROGRESS NOTES
At 0450 RN was called to the patients room. Pt was complaining of quick onset sharp chest pain radiated to the right upper chest. Pt stated that it was hard for her to breathe and she had never felt pain like this before. Pt notified the charge nurse and then called a Rapid Response. From this rapid response, a stat EKG, Troponins, and Chest X Ray was ordered. All of these labs came back WDL besides the pt had elongated QT's. Xanax was administered at 0500 for the patient's anxiety. Vital signs were also taken at 0500 and 0512. RN notified the MD. RN then gave the PRN morphine order on the MAR for breakthrough pain. Pt stated she had felt a little better and was put at ease. Pt now at 0625 is resting comfortably and sleeping.

## 2018-10-09 NOTE — PROGRESS NOTES
Pt on unit from [facility] at 1733  Ambulated from gurney to bathroom to bed 1 PA  -------------------------------------------------------------------    Pt voided into toilet and vaginal packing came out  Will notify MD  ----------------------------------------------------------  2 RN skin check complete:    Low abd lap site  Mid abd incision  Dressings CDI  No other areas of redness or concern noted  -----------------------------------------------------------------  Pt states pain 7/10 but states it is tolerable  ---------------------------------------------------------  Admit profile complete  Previously received pna vaccine  Pt refuses flu shot

## 2018-10-09 NOTE — PROGRESS NOTES
Patient discharged home with mother.    IV removed prior to discharge.   Signed prescriptions given to patient by MD.  Discharge education provided, all questions answered.   Verbalized understanding of discharge education.   Ambulated out with all personal belongings collected from room.

## 2018-10-09 NOTE — CARE PLAN
Problem: Safety  Goal: Will remain free from injury  Outcome: PROGRESSING AS EXPECTED  Bed low and locked, call light and belongings in reach, hourly rounding in place, pt calls appropriately for assistance    Problem: Knowledge Deficit  Goal: Knowledge of disease process/condition, treatment plan, diagnostic tests, and medications will improve  Outcome: PROGRESSING AS EXPECTED  Welcome packet given, educated on floor polices, fall precautions, call light usage

## 2018-10-09 NOTE — DISCHARGE INSTRUCTIONS
Discharge Instructions    Discharged to home by car with relative. Discharged via wheelchair, hospital escort: Yes.  Special equipment needed: Not Applicable    Be sure to schedule a follow-up appointment with your primary care doctor or any specialists as instructed.     Discharge Plan:   Diet Plan: Discussed  Activity Level: Discussed  Confirmed Follow up Appointment: Patient to Call and Schedule Appointment  Confirmed Symptoms Management: Discussed  Medication Reconciliation Updated: Yes  Influenza Vaccine Indication: Patient Refuses    I understand that a diet low in cholesterol, fat, and sodium is recommended for good health. Unless I have been given specific instructions below for another diet, I accept this instruction as my diet prescription.   Other diet:     Special Instructions: None    · Is patient discharged on Warfarin / Coumadin?   No     Depression / Suicide Risk    As you are discharged from this [facility] facility, it is important to learn how to keep safe from harming yourself.    Recognize the warning signs:  · Abrupt changes in personality, positive or negative- including increase in energy   · Giving away possessions  · Change in eating patterns- significant weight changes-  positive or negative  · Change in sleeping patterns- unable to sleep or sleeping all the time   · Unwillingness or inability to communicate  · Depression  · Unusual sadness, discouragement and loneliness  · Talk of wanting to die  · Neglect of personal appearance   · Rebelliousness- reckless behavior  · Withdrawal from people/activities they love  · Confusion- inability to concentrate     If you or a loved one observes any of these behaviors or has concerns about self-harm, here's what you can do:  · Talk about it- your feelings and reasons for harming yourself  · Remove any means that you might use to hurt yourself (examples: pills, rope, extension cords, firearm)  · Get professional help from the community (Mental  Health, Substance Abuse, psychological counseling)  · Do not be alone:Call your Safe Contact- someone whom you trust who will be there for you.  · Call your local CRISIS HOTLINE 832-1146 or 017-784-9751  · Call your local Children's Mobile Crisis Response Team [place] (739) 322-5761 or www.Promimic  · Call the toll free National Suicide Prevention Hotlines   · National Suicide Prevention Lifeline 966-630-AFDX (7367)  · National Hope Line Network 800-SUICIDE (863-4268)    Laparoscopically Assisted Vaginal Hysterectomy, Care After  Refer to this sheet in the next few weeks. These instructions provide you with information on caring for yourself after your procedure. Your health care provider may also give you more specific instructions. Your treatment has been planned according to current medical practices, but problems sometimes occur. Call your health care provider if you have any problems or questions after your procedure.  WHAT TO EXPECT AFTER THE PROCEDURE  After your procedure, it is typical to have the following:  · Abdominal pain. You will be given pain medicine to control it.  · Sore throat from the breathing tube that was inserted during surgery.  HOME CARE INSTRUCTIONS  · Only take over-the-counter or prescription medicines for pain, discomfort, or fever as directed by your health care provider.  · Do not take aspirin. It can cause bleeding.  · Do not drive when taking pain medicine.  · Follow your health care provider's advice regarding diet, exercise, lifting, driving, and general activities.  · Resume your usual diet as directed and allowed.  · Get plenty of rest and sleep.  · Do not douche, use tampons, or have sexual intercourse for at least 6 weeks, or until your health care provider gives you permission.  · Change your bandages (dressings) as directed by your health care provider.  · Monitor your temperature and notify your health care provider of a fever.  · Take showers instead of  baths for 2-3 weeks.  · Do not drink alcohol until your health care provider gives you permission.  · If you develop constipation, you may take a mild laxative with your health care provider's permission. Bran foods may help with constipation problems. Drinking enough fluids to keep your urine clear or pale yellow may help as well.  · Try to have someone home with you for 1-2 weeks to help around the house.  · Keep all of your follow-up appointments as directed by your health care provider.  SEEK MEDICAL CARE IF:   · You have swelling, redness, or increasing pain around your incision sites.  · You have pus coming from your incision.  · You notice a bad smell coming from your incision.  · Your incision breaks open.  · You feel dizzy or lightheaded.  · You have pain or bleeding when you urinate.  · You have persistent diarrhea.  · You have persistent nausea and vomiting.  · You have abnormal vaginal discharge.  · You have a rash.  · You have any type of abnormal reaction or develop an allergy to your medicine.  · You have poor pain control with your prescribed medicine.  SEEK IMMEDIATE MEDICAL CARE IF:   · You have a fever.  · You have severe abdominal pain.  · You have chest pain.  · You have shortness of breath.  · You faint.  · You have pain, swelling, or redness in your leg.  · You have heavy vaginal bleeding with blood clots.  MAKE SURE YOU:  · Understand these instructions.  · Will watch your condition.  · Will get help right away if you are not doing well or get worse.     This information is not intended to replace advice given to you by your health care provider. Make sure you discuss any questions you have with your health care provider.     Document Released: 12/06/2012 Document Revised: 12/23/2014 Document Reviewed: 07/03/2014  [employer] Interactive Patient Education ©2016 [employer]    Incision Care, Adult  An incision is a cut that a doctor makes in your skin for surgery (for a procedure). Most times,  these cuts are closed after surgery. Your cut from surgery may be closed with stitches (sutures), staples, skin glue, or skin tape (adhesive strips). You may need to return to your doctor to have stitches or staples taken out. This may happen many days or many weeks after your surgery. The cut needs to be well cared for so it does not get infected.  How to care for your cut  Cut care  · Follow instructions from your doctor about how to take care of your cut. Make sure you:  ¨ Wash your hands with soap and water before you change your bandage (dressing). If you cannot use soap and water, use hand .  ¨ Change your bandage as told by your doctor.  ¨ Leave stitches, skin glue, or skin tape in place. They may need to stay in place for 2 weeks or longer. If tape strips get loose and curl up, you may trim the loose edges. Do not remove tape strips completely unless your doctor says it is okay.  · Check your cut area every day for signs of infection. Check for:  ¨ More redness, swelling, or pain.  ¨ More fluid or blood.  ¨ Warmth.  ¨ Pus or a bad smell.  · Ask your doctor how to clean the cut. This may include:  ¨ Using mild soap and water.  ¨ Using a clean towel to pat the cut dry after you clean it.  ¨ Putting a cream or ointment on the cut. Do this only as told by your doctor.  ¨ Covering the cut with a clean bandage.  · Ask your doctor when you can leave the cut uncovered.  · Do not take baths, swim, or use a hot tub until your doctor says it is okay. Ask your doctor if you can take showers. You may only be allowed to take sponge baths for bathing.  Medicines  · If you were prescribed an antibiotic medicine, cream, or ointment, take the antibiotic or put it on the cut as told by your doctor. Do not stop taking or putting on the antibiotic even if your condition gets better.  · Take over-the-counter and prescription medicines only as told by your doctor.  General instructions  · Limit movement around your cut.  This helps healing.  ¨ Avoid straining, lifting, or exercise for the first month, or for as long as told by your doctor.  ¨ Follow instructions from your doctor about going back to your normal activities.  ¨ Ask your doctor what activities are safe.  · Protect your cut from the sun when you are outside for the first 6 months, or for as long as told by your doctor. Put on sunscreen around the scar or cover up the scar.  · Keep all follow-up visits as told by your doctor. This is important.  Contact a doctor if:  · Your have more redness, swelling, or pain around the cut.  · You have more fluid or blood coming from the cut.  · Your cut feels warm to the touch.  · You have pus or a bad smell coming from the cut.  · You have a fever or shaking chills.  · You feel sick to your stomach (nauseous) or you throw up (vomit).  · You are dizzy.  · Your stitches or staples come undone.  Get help right away if:  · You have a red streak coming from your cut.  · Your cut bleeds through the bandage and the bleeding does not stop with gentle pressure.  · The edges of your cut open up and separate.  · You have very bad (severe) pain.  · You have a rash.  · You are confused.  · You pass out (faint).  · You have trouble breathing and you have a fast heartbeat.  This information is not intended to replace advice given to you by your health care provider. Make sure you discuss any questions you have with your health care provider.  Document Released: 03/11/2013 Document Revised: 08/25/2017 Document Reviewed: 08/25/2017  [employer] Interactive Patient Education © 2017 [employer]

## 2018-10-09 NOTE — PROGRESS NOTES
The patient complains of right shoulder pain post op day 1 LAVH/Bilateral salpingectomy.  Temp is mildly elevated and white count is elevated post op. My impression is the pain is secondary to CO2 irritation of the vagus nerve post laparoscopy.  The patient's vaginal packing was removed yesterday and I was called and appraised of her pain early this morning.  Her lungs are clear, her abdomen is soft and there is no Homern's sign.  The patient will be observed today and most likely discharge this afternoon.  I have recommended she lie flat so the gas can be absorbed.  She has received her antianxiety medications. She will be discharged on Norco 7.5 mg/325 mg # 30 - one po q 4-6 hrs prn pain, and cipro 500 mg. oraly BID for 5 days.  RTO in 2 weeks and 6 weeks.  Post op instructions were given.  BRUCE Nesbitt M.D.

## 2018-10-09 NOTE — PROGRESS NOTES
Assumed care of patient who is AOx4, but is drowsy. Pt rates pain 3 out of 10, but has no nausea or dizziness. Pt is tolerating regular diet adequately. Pt surgical inisions appear clean, dry, intact with large sized band aids. Pt is voiding adequatly in the toliet and her last BM was PTA. With pt urinePt is up with assistance of 1.     RN Reviewed plan of care with patient, bed in lowest position and locked, pt resting comfortably now, call light within reach, all needs met at this time

## 2018-10-09 NOTE — CARE PLAN
Problem: Safety  Goal: Will remain free from injury    Intervention: Provide assistance with mobility   10/08/18 1935   OTHER   Assistance / Tolerance Assistance of One   Educated pt on calling for assistance      Problem: Pain Management  Goal: Pain level will decrease to patient's comfort goal    Intervention: Educate and implement non-pharmacologic comfort measures. Examples: relaxation, distration, play therapy, activity therapy, massage, etc.   10/08/18 2008   OTHER   Intervention Medication (see MAR);Repositioned;Rest;Relaxation Technique;Environmental Changes;Food

## 2018-10-09 NOTE — PROGRESS NOTES
Dr Nesbitt paged to update on vag packing falling out while voiding    Per MD it is ok for packing to be out. Does not need to be replaced
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
## 2019-05-13 NOTE — PROGRESS NOTES
Dr Cueto updated r/g pt requesting diet order, order received for [facility]D advanced as tolerated.  
IV D/C'd.  Discharge instructions provided to pt.  Pt states understanding.  Pt states all questions have been answered.  Copy of discharge provided to pt.  Signed copy in chart.  Prescriptions provided to pt, copy in chart. Pt states that all personal belongings are in possession.  Pt escorted off unit without incident.     
Labs sent.  
Pt arrived to floor via gurney, ambulated to bed, with initial c/o nausea, abdominal pain, see flowchart. Oriented pt to room, bed, call light, floor. POC discussed, pt verbalized understanding and agreement. A&Ox4, pt's personal belongings and call light within reach.  
Pt refused Influenza, Pneumonia vaccine current.  
Pt resting calmly, with eyes closed, is stable with arousal. Denies pain, nausea, or needs at this time. Call light and personal possessions within reach, will continue to monitor.   
Pt woke up and pooped her pants again. Brought her new pajamas, underwear, and changed the pads on her bed. RN notified.  
Pt woke up, realized that she pooped her pants. Brought her new pajamas, underwear, and changed the pads on her bed. RN notified.  
Received report from evening RN.  Pt is resting in bed with eyes closed.  Visualized rise and fall of chest.  Monitors applied, VSS.  Will continue to closely monitor. Call light within reach.   
Add 87022 Cpt? (Important Note: In 2017 The Use Of 94555 Is Being Tracked By Cms To Determine Future Global Period Reimbursement For Global Periods): yes
Body Location Override (Optional - Billing Will Still Be Based On Selected Body Map Location If Applicable): right dorsal index metacarpophalangeal joint and 1st web space right hand
Detail Level: Detailed

## 2020-08-30 ENCOUNTER — OFFICE VISIT (OUTPATIENT)
Dept: URGENT CARE | Facility: CLINIC | Age: 35
End: 2020-08-30
Payer: MEDICAID

## 2020-08-30 VITALS
WEIGHT: 181 LBS | OXYGEN SATURATION: 99 % | BODY MASS INDEX: 30.9 KG/M2 | DIASTOLIC BLOOD PRESSURE: 76 MMHG | HEIGHT: 64 IN | RESPIRATION RATE: 14 BRPM | SYSTOLIC BLOOD PRESSURE: 112 MMHG | TEMPERATURE: 97 F | HEART RATE: 77 BPM

## 2020-08-30 DIAGNOSIS — B86: ICD-10-CM

## 2020-08-30 PROCEDURE — 99203 OFFICE O/P NEW LOW 30 MIN: CPT | Performed by: NURSE PRACTITIONER

## 2020-08-30 RX ORDER — PERMETHRIN 50 MG/G
1 CREAM TOPICAL ONCE
Qty: 60 G | Refills: 1 | Status: SHIPPED | OUTPATIENT
Start: 2020-08-30 | End: 2020-08-30

## 2020-08-30 ASSESSMENT — ENCOUNTER SYMPTOMS
FEVER: 0
DIZZINESS: 0
MYALGIAS: 0
CHILLS: 0
VOMITING: 0
ABDOMINAL PAIN: 0
HEADACHES: 0
NAUSEA: 0

## 2020-08-30 ASSESSMENT — FIBROSIS 4 INDEX: FIB4 SCORE: 0.43

## 2020-08-30 NOTE — PROGRESS NOTES
Subjective:     Rikki Toscano is a 35 y.o. female who presents for Rash (x 2 years all over her body.  Mom was Dx'd with scabies. )      Rash  Pertinent negatives include no fever or vomiting.     Pt presents for evaluation of a new problem to urgent care.  She states over the past 2 years she has had an itchy rash that has caused sores all over her body.  She has been seen multiple times in her primary care provider's office for this rash and has been's prescribed antibiotics for treatment.  She states that this does help with the infection however, her rash has remained persistent.  Yesterday her mom with whom she lives with took her dog to the vet and was diagnosed with scabies.  Her mom then sought care and was also diagnosed with a scabies infestation.  She presents to the urgent care today for treatment.  She denies fever, chills or nausea/vomiting.    Review of Systems   Constitutional: Negative for chills, fever and malaise/fatigue.   Gastrointestinal: Negative for abdominal pain, nausea and vomiting.   Musculoskeletal: Negative for myalgias.   Skin: Positive for itching and rash.   Neurological: Negative for dizziness and headaches.       PMH:   Past Medical History:   Diagnosis Date   • ASTHMA 10/02/2018    prn inhalers   • Heart burn 10/02/2018     ALLERGIES: No Known Allergies  SURGHX:   Past Surgical History:   Procedure Laterality Date   • VAGINAL HYSTERECTOMY SCOPE TOTAL  10/8/2018    Procedure: VAGINAL HYSTERECTOMY SCOPE TOTAL;  Surgeon: Johnathan Nesbitt M.D.;  Location: SURGERY SAME DAY [facility];  Service: Gynecology   • SALPINGECTOMY Bilateral 10/8/2018    Procedure: SALPINGECTOMY;  Surgeon: Johnathan Nesbitt M.D.;  Location: SURGERY SAME DAY [facility];  Service: Gynecology   • CYSTOSCOPY  10/8/2018    Procedure: CYSTOSCOPY;  Surgeon: Johnathan Nesbitt M.D.;  Location: SURGERY SAME DAY [facility];  Service: Gynecology   • GYN SURGERY  2010       • OTHER      tonsillys  "and adnoids     SOCHX:   Social History     Socioeconomic History   • Marital status: Single     Spouse name: Not on file   • Number of children: Not on file   • Years of education: Not on file   • Highest education level: Not on file   Occupational History   • Not on file   Social Needs   • Financial resource strain: Not on file   • Food insecurity     Worry: Not on file     Inability: Not on file   • Transportation needs     Medical: Not on file     Non-medical: Not on file   Tobacco Use   • Smoking status: Former Smoker     Packs/day: 0.50     Years: 10.00     Pack years: 5.00     Types: Cigarettes     Quit date: 2017     Years since quittin.9   • Smokeless tobacco: Never Used   • Tobacco comment: quit 3/25/10   Substance and Sexual Activity   • Alcohol use: No   • Drug use: No   • Sexual activity: Not Currently   Lifestyle   • Physical activity     Days per week: Not on file     Minutes per session: Not on file   • Stress: Not on file   Relationships   • Social connections     Talks on phone: Not on file     Gets together: Not on file     Attends [employer]odoxy service: Not on file     Active member of club or organization: Not on file     Attends meetings of clubs or organizations: Not on file     Relationship status: Not on file   • Intimate partner violence     Fear of current or ex partner: Not on file     Emotionally abused: Not on file     Physically abused: Not on file     Forced sexual activity: Not on file   Other Topics Concern   • Not on file   Social History Narrative   • Not on file     FH: No family history on file.      Objective:   /76   Pulse 77   Temp 36.1 °C (97 °F) (Temporal)   Resp 14   Ht 1.626 m (5' 4\")   Wt 82.1 kg (181 lb)   LMP 2017   SpO2 99%   BMI 31.07 kg/m²     Physical Exam  Vitals signs and nursing note reviewed.   Constitutional:       General: She is not in acute distress.     Appearance: Normal appearance. She is obese. She is not ill-appearing.   HENT:  "      Head: Normocephalic and atraumatic.      Right Ear: External ear normal.      Left Ear: External ear normal.      Nose: No congestion or rhinorrhea.      Mouth/Throat:      Mouth: Mucous membranes are moist.   Eyes:      Extraocular Movements: Extraocular movements intact.      Pupils: Pupils are equal, round, and reactive to light.   Neck:      Musculoskeletal: Normal range of motion and neck supple.   Cardiovascular:      Rate and Rhythm: Normal rate and regular rhythm.      Pulses: Normal pulses.      Heart sounds: Normal heart sounds.   Pulmonary:      Effort: Pulmonary effort is normal.      Breath sounds: Normal breath sounds.   Abdominal:      General: Abdomen is flat.      Palpations: Abdomen is soft.   Musculoskeletal: Normal range of motion.   Skin:     General: Skin is warm and dry.      Capillary Refill: Capillary refill takes less than 2 seconds.      Findings: No erythema. Rash is not crusting, scaling or vesicular.      Comments: Multiple healing scabbed and scarred lesions over trunk, breasts, BLE and BUE.    Neurological:      General: No focal deficit present.      Mental Status: She is alert and oriented to person, place, and time. Mental status is at baseline.   Psychiatric:         Mood and Affect: Mood normal.         Behavior: Behavior normal.         Thought Content: Thought content normal.         Judgment: Judgment normal.              Assessment/Plan:   Assessment    1. Animal-transmitted scabies  permethrin (ELIMITE) 5 % Cream   Patient will be treated for scabies infection. Refill of medication sent in in case first treatment is not successful.  AVS handout given and reviewed with patient. Pt educated on red flags and when to seek treatment back in ER or UC.

## 2021-04-05 ENCOUNTER — HOSPITAL ENCOUNTER (EMERGENCY)
Dept: HOSPITAL 8 - ED | Age: 36
Discharge: HOME | End: 2021-04-05
Payer: COMMERCIAL

## 2021-04-05 VITALS — HEIGHT: 64 IN | BODY MASS INDEX: 31.24 KG/M2 | WEIGHT: 182.98 LBS

## 2021-04-05 VITALS — DIASTOLIC BLOOD PRESSURE: 67 MMHG | SYSTOLIC BLOOD PRESSURE: 109 MMHG

## 2021-04-05 DIAGNOSIS — M94.0: Primary | ICD-10-CM

## 2021-04-05 DIAGNOSIS — R07.89: ICD-10-CM

## 2021-04-05 LAB
ALBUMIN SERPL-MCNC: 3.8 G/DL (ref 3.4–5)
ALP SERPL-CCNC: 57 U/L (ref 45–117)
ALT SERPL-CCNC: 14 U/L (ref 12–78)
ANION GAP SERPL CALC-SCNC: 8 MMOL/L (ref 5–15)
BASOPHILS # BLD AUTO: 0 X10^3/UL (ref 0–0.1)
BASOPHILS NFR BLD AUTO: 1 % (ref 0–1)
BILIRUB SERPL-MCNC: 0.2 MG/DL (ref 0.2–1)
CALCIUM SERPL-MCNC: 8.9 MG/DL (ref 8.5–10.1)
CHLORIDE SERPL-SCNC: 107 MMOL/L (ref 98–107)
CREAT SERPL-MCNC: 0.79 MG/DL (ref 0.55–1.02)
EOSINOPHIL # BLD AUTO: 0.3 X10^3/UL (ref 0–0.4)
EOSINOPHIL NFR BLD AUTO: 3 % (ref 1–7)
ERYTHROCYTE [DISTWIDTH] IN BLOOD BY AUTOMATED COUNT: 13.2 % (ref 9.6–15.2)
LYMPHOCYTES # BLD AUTO: 3.7 X10^3/UL (ref 1–3.4)
LYMPHOCYTES NFR BLD AUTO: 38 % (ref 22–44)
MCH RBC QN AUTO: 29.7 PG (ref 27–34.8)
MCHC RBC AUTO-ENTMCNC: 34.1 G/DL (ref 32.4–35.8)
MD: NO
MONOCYTES # BLD AUTO: 0.5 X10^3/UL (ref 0.2–0.8)
MONOCYTES NFR BLD AUTO: 5 % (ref 2–9)
NEUTROPHILS # BLD AUTO: 5.1 X10^3/UL (ref 1.8–6.8)
NEUTROPHILS NFR BLD AUTO: 53 % (ref 42–75)
PLATELET # BLD AUTO: 261 X10^3/UL (ref 130–400)
PMV BLD AUTO: 8.1 FL (ref 7.4–10.4)
PROT SERPL-MCNC: 7.3 G/DL (ref 6.4–8.2)
RBC # BLD AUTO: 4.67 X10^6/UL (ref 3.82–5.3)
TROPONIN I SERPL-MCNC: < 0.015 NG/ML (ref 0–0.04)

## 2021-04-05 PROCEDURE — 71045 X-RAY EXAM CHEST 1 VIEW: CPT

## 2021-04-05 PROCEDURE — 84484 ASSAY OF TROPONIN QUANT: CPT

## 2021-04-05 PROCEDURE — 99285 EMERGENCY DEPT VISIT HI MDM: CPT

## 2021-04-05 PROCEDURE — 96372 THER/PROPH/DIAG INJ SC/IM: CPT

## 2021-04-05 PROCEDURE — 93005 ELECTROCARDIOGRAM TRACING: CPT

## 2021-04-05 PROCEDURE — 80053 COMPREHEN METABOLIC PANEL: CPT

## 2021-04-05 PROCEDURE — 36415 COLL VENOUS BLD VENIPUNCTURE: CPT

## 2021-04-05 PROCEDURE — 85025 COMPLETE CBC W/AUTO DIFF WBC: CPT

## (undated) DEVICE — SUTURE 1 6-18IN COATED VICRYL - PLUS VIO TIES(12PK/BX)

## (undated) DEVICE — SUCTION INSTRUMENT YANKAUER BULBOUS TIP W/O VENT (50EA/CA)

## (undated) DEVICE — ELECTRODE 850 FOAM ADHESIVE - HYDROGEL RADIOTRNSPRNT (50/PK)

## (undated) DEVICE — TROCAR STEP 11MM - (3/CA)

## (undated) DEVICE — TRAY FOLEY CATHETER STATLOCK 16FR SURESTEP  (10EA/CA)

## (undated) DEVICE — PAD BABY LAP 4X18 W/O - RINGS PREWASHED 5/PK 40PK/CS

## (undated) DEVICE — SUTURE 4-0 VICRYL PLUS FS-2 - 27 INCH (36/BX)

## (undated) DEVICE — GLOVE BIOGEL SZ 8.5 SURGICAL PF LTX - (50PR/BX 4BX/CA)

## (undated) DEVICE — BANDAID X-LARGE 2 X 4 IN LF (50EA/BX)

## (undated) DEVICE — DRAPE VAGINAL BIB W/ POUCH (10EA/CA)

## (undated) DEVICE — MASK ANESTHESIA ADULT  - (100/CA)

## (undated) DEVICE — SET SUCTION/IRRIGATION WITH DISPOSABLE TIP (6/CA )PART #0250-070-520 IS A SUB

## (undated) DEVICE — GLOVE BIOGEL SZ 6.5 SURGICAL PF LTX (50PR/BX 4BX/CA)

## (undated) DEVICE — SUTURE 0 VICRYL PLUS CT-1 - 36 INCH (36/BX)

## (undated) DEVICE — WATER IRRIGATION STERILE 1000ML (12EA/CA)

## (undated) DEVICE — LIGASURE 5MM BLUNT TIP LONG - 44CM (6EA/PK)

## (undated) DEVICE — GLOVE BIOGEL INDICATOR SZ 8.5 SURGICAL PF LTX - (50/BX 4BX/CA)

## (undated) DEVICE — SET LEADWIRE 5 LEAD BEDSIDE DISPOSABLE ECG (1SET OF 5/EA)

## (undated) DEVICE — CHLORAPREP 26 ML APPLICATOR - ORANGE TINT(25/CA)

## (undated) DEVICE — HEMOSTAT ARISTA PWD 5 GRAM - (5/BX)

## (undated) DEVICE — PROTECTOR ULNA NERVE - (36PR/CA)

## (undated) DEVICE — NEPTUNE 4 PORT MANIFOLD - (20/PK)

## (undated) DEVICE — ARMREST CRADLE FOAM - (2PR/PK 12PR/CA)

## (undated) DEVICE — SUTURE 2-0 VICRYL PLUS CT-2 - 27 INCH (36/BX)

## (undated) DEVICE — WATER IRRIG. STER 3000 ML - (4/CA)

## (undated) DEVICE — ELECTRODE DUAL RETURN W/ CORD - (50/PK)

## (undated) DEVICE — SLEEVE, VASO, THIGH, MED

## (undated) DEVICE — SUTURE 0 VICRYL PLUS CT-1 - 8 X 18 INCH (12/BX)

## (undated) DEVICE — GLOVE SZ 6 BIOGEL PI MICRO - PF LF (50PR/BX 4BX/CA)

## (undated) DEVICE — SUTURE 0 VICRYL PLUS UR-6 - 27 INCH (36/BX)

## (undated) DEVICE — APPICATOR HEMOSTAT ARISTA XL - FLEXITIP (10EA/CA)

## (undated) DEVICE — SYRINGE SAFETY 3 ML 18 GA X 1 1/2 BLUNT LL (100/BX 8BX/CA)

## (undated) DEVICE — KIT  I.V. START (100EA/CA)

## (undated) DEVICE — BANDAID SHEER STRIP 3/4 IN (100EA/BX 12BX/CA)

## (undated) DEVICE — HEAD HOLDER JUNIOR/ADULT

## (undated) DEVICE — TUBING CLEARLINK DUO-VENT - C-FLO (48EA/CA)

## (undated) DEVICE — BLADE SURGICAL #10 - (50/BX)

## (undated) DEVICE — TUBING SETDISPOS HIGH FLOW II - (10/BX)

## (undated) DEVICE — CANISTER SUCTION RIGID RED 1500CC (40EA/CA)

## (undated) DEVICE — TRAY SRGPRP PVP IOD WT PRP - (20/CA)

## (undated) DEVICE — SET IRRIGATION CYSTOSCOPY TUBE L80 IN (20EA/CA)

## (undated) DEVICE — NEEDLE INSUFFLATION FOR STEP - (12/BX)

## (undated) DEVICE — PACK MINOR BASIN - (2EA/CA)

## (undated) DEVICE — SODIUM CHL IRRIGATION 0.9% 1000ML (12EA/CA)

## (undated) DEVICE — CATHETER IV 20 GA X 1-1/4 ---SURG.& SDS ONLY--- (50EA/BX)

## (undated) DEVICE — PAD SANITARY 11IN MAXI IND WRAPPED  (12EA/PK 24PK/CA)

## (undated) DEVICE — TUBE CONNECTING SUCTION - CLEAR PLASTIC STERILE 72 IN (50EA/CA)

## (undated) DEVICE — LACTATED RINGERS INJ 1000 ML - (14EA/CA 60CA/PF)

## (undated) DEVICE — SENSOR SPO2 NEO LNCS ADHESIVE (20/BX) SEE USER NOTES

## (undated) DEVICE — TROCAR STEP 5MM - (3/CA)

## (undated) DEVICE — CANISTER SUCTION 3000ML MECHANICAL FILTER AUTO SHUTOFF MEDI-VAC NONSTERILE LF DISP  (40EA/CA)

## (undated) DEVICE — UTERINE MANIP RUMI 6.7X8 - (5/BX)

## (undated) DEVICE — GOWN SURGEONS X-LARGE - DISP. (30/CA)

## (undated) DEVICE — SUTURE GENERAL

## (undated) DEVICE — PACKING VAG 2 X-RAY (24EA/CS)

## (undated) DEVICE — TUBE E-T HI-LO CUFF 6.5MM (10EA/BX)

## (undated) DEVICE — PACK LAPAROSCOPY - (1/CA)

## (undated) DEVICE — KIT ANESTHESIA W/CIRCUIT & 3/LT BAG W/FILTER (20EA/CA)

## (undated) DEVICE — GOWN WARMING STANDARD FLEX - (30/CA)

## (undated) DEVICE — BLANKET WARMING UPPER BODY - (10/CA)

## (undated) DEVICE — SET EXTENSION WITH 2 PORTS (48EA/CA) ***PART #2C8610 IS A SUBSTITUTE*****